# Patient Record
Sex: FEMALE | Race: BLACK OR AFRICAN AMERICAN | Employment: UNEMPLOYED | URBAN - METROPOLITAN AREA
[De-identification: names, ages, dates, MRNs, and addresses within clinical notes are randomized per-mention and may not be internally consistent; named-entity substitution may affect disease eponyms.]

---

## 2017-05-22 ENCOUNTER — HOSPITAL ENCOUNTER (OUTPATIENT)
Dept: LAB | Age: 42
Discharge: HOME | End: 2017-05-22

## 2017-05-22 ENCOUNTER — OFFICE VISIT (OUTPATIENT)
Dept: OBGYN CLINIC | Age: 42
End: 2017-05-22

## 2017-05-22 ENCOUNTER — HOSPITAL ENCOUNTER (OUTPATIENT)
Dept: LAB | Age: 42
Discharge: HOME | End: 2017-05-22
Payer: COMMERCIAL

## 2017-05-22 VITALS
HEART RATE: 81 BPM | SYSTOLIC BLOOD PRESSURE: 119 MMHG | RESPIRATION RATE: 18 BRPM | HEIGHT: 68 IN | WEIGHT: 248 LBS | DIASTOLIC BLOOD PRESSURE: 64 MMHG | BODY MASS INDEX: 37.59 KG/M2

## 2017-05-22 DIAGNOSIS — Z01.419 ENCOUNTER FOR GYNECOLOGICAL EXAMINATION WITHOUT ABNORMAL FINDING: Primary | ICD-10-CM

## 2017-05-22 DIAGNOSIS — N32.81 OAB (OVERACTIVE BLADDER): ICD-10-CM

## 2017-05-22 LAB
BILIRUB UR QL STRIP: NEGATIVE
GLUCOSE UR-MCNC: NEGATIVE MG/DL
KETONES P FAST UR STRIP-MCNC: NEGATIVE MG/DL
PH UR STRIP: 6.5 [PH] (ref 4.6–8)
PROT UR QL STRIP: NEGATIVE MG/DL
SP GR UR STRIP: 1.01 (ref 1–1.03)
UA UROBILINOGEN AMB POC: NORMAL (ref 0.2–1)
URINALYSIS CLARITY POC: CLEAR
URINALYSIS COLOR POC: YELLOW
URINE BLOOD POC: NORMAL
URINE LEUKOCYTES POC: NEGATIVE
URINE NITRITES POC: NEGATIVE

## 2017-05-22 PROCEDURE — 88175 CYTOPATH C/V AUTO FLUID REDO: CPT | Performed by: OBSTETRICS & GYNECOLOGY

## 2017-05-22 PROCEDURE — 87624 HPV HI-RISK TYP POOLED RSLT: CPT | Performed by: OBSTETRICS & GYNECOLOGY

## 2017-05-22 PROCEDURE — 99001 SPECIMEN HANDLING PT-LAB: CPT | Performed by: OBSTETRICS & GYNECOLOGY

## 2017-05-22 RX ORDER — LEVONORGESTREL AND ETHINYL ESTRADIOL 0.15-0.03
1 KIT ORAL DAILY
Qty: 1 PACKAGE | Refills: 3 | Status: SHIPPED | OUTPATIENT
Start: 2017-05-22 | End: 2017-05-22 | Stop reason: SDUPTHER

## 2017-05-22 RX ORDER — LEVONORGESTREL AND ETHINYL ESTRADIOL 0.15-0.03
1 KIT ORAL DAILY
Qty: 1 PACKAGE | Refills: 3 | Status: SHIPPED | OUTPATIENT
Start: 2017-05-22 | End: 2019-04-22 | Stop reason: CLARIF

## 2017-05-22 NOTE — PROGRESS NOTES
Subjective:   46 y.o. female for Well Woman Check. Patient's last menstrual period was 01/01/2017. Social History: single partner, contraception - OCP (Oral Contraceptive Pills). Pertinent past medical hstory: no history of HTN, DVT, CAD, DM, liver disease, migraines or smoking. Current Outpatient Prescriptions   Medication Sig Dispense Refill    levonorgestrel-ethinyl estradiol (SEASONALE CONTRACEPTIVE) 0.15 mg-30 mcg 3MPk Take 1 Tab by mouth daily. 1 Package 3    Dalfampridine (AMPYRA) 10 mg Tb12 Take  by mouth.  mirabegron ER (MYRBETRIQ) 25 mg ER tablet Take 1 Tab by mouth daily. 90 Tab 3    mirabegron ER (MYRBETRIQ) 50 mg ER tablet Take 1 Tab by mouth daily. Indications: URINARY URGE INCONTINENCE 90 Tab 6    tiZANidine (ZANAFLEX) 2 mg tablet       silodosin (RAPAFLO) 8 mg capsule Take 1 Cap by mouth daily (with dinner). 90 Cap 4    DIMETHYL FUMARATE (TECFIDERA PO) 1 twice a day      ergocalciferol (ERGOCALCIFEROL) 50,000 unit capsule once a week      gabapentin (NEURONTIN) 100 mg capsule   1    calcium 500 mg Tab Take  by mouth.  multivitamin (ONE A DAY) tablet Take 1 Tab by mouth daily.          Allergies   Allergen Reactions    Augmentin [Amoxicillin-Pot Clavulanate] Hives and Rash     Past Medical History:   Diagnosis Date    Cervical spondylosis     Hypoglycemia     Incontinence     Neurogenic bladder     OAB (overactive bladder)     Urge incontinence     Vitamin D deficiency      Past Surgical History:   Procedure Laterality Date    HX CHOLECYSTECTOMY      HX GASTRIC BYPASS  2004    LAP,CHOLECYSTECTOMY       Family History   Problem Relation Age of Onset    Hypertension Mother     Diabetes Father     Heart Attack Father     No Known Problems Sister     Diabetes Brother     No Known Problems Sister     Breast Cancer Maternal Grandmother      Social History   Substance Use Topics    Smoking status: Never Smoker    Smokeless tobacco: Never Used    Alcohol use Yes ROS:  Feeling well. No dyspnea or chest pain on exertion. No abdominal pain, change in bowel habits, black or bloody stools. No urinary tract symptoms. GYN ROS: normal menses, no abnormal bleeding, pelvic pain or discharge, no breast pain or new or enlarging lumps on self exam. No neurological complaints. Objective:     Visit Vitals    /64    Pulse 81    Resp 18    Ht 5' 8\" (1.727 m)    Wt 248 lb (112.5 kg)    LMP 01/01/2017    BMI 37.71 kg/m2     The patient appears well, alert, oriented x 3, in no distress. ENT normal.  Neck supple. No adenopathy or thyromegaly. KIRTI. Lungs are clear, good air entry, no wheezes, rhonchi or rales. S1 and S2 normal, no murmurs, regular rate and rhythm. Abdomen soft without tenderness, guarding, mass or organomegaly. Extremities show no edema, normal peripheral pulses. Neurological is normal, no focal findings.     BREAST EXAM: breasts appear normal, no suspicious masses, no skin or nipple changes or axillary nodes    PELVIC EXAM: normal external genitalia, vulva, vagina, cervix, uterus and adnexa, PAP: Pap smear done today, HPV test    Assessment/Plan:   well woman  no contraindication to continue use of oral contraceptives  pap smear  return annually or prn

## 2017-05-23 ENCOUNTER — HOSPITAL ENCOUNTER (OUTPATIENT)
Dept: PHYSICAL THERAPY | Age: 42
Discharge: HOME | End: 2017-05-23
Payer: COMMERCIAL

## 2017-05-23 LAB — B-HCG SERPL QL: NEGATIVE MIU/ML

## 2017-05-23 PROCEDURE — 97110 THERAPEUTIC EXERCISES: CPT

## 2017-05-23 PROCEDURE — 97161 PT EVAL LOW COMPLEX 20 MIN: CPT

## 2017-05-23 NOTE — PROGRESS NOTES
[facility] PHYSICAL THERAPY  [address] [place] [address], [address],[facility], [address] - Phone: [phone]  Fax: 10-23-35-77 OF CARE / [address]  Patient Name: Saulo Medellin : 1975   Medical   Diagnosis: Right foot drop [M21.371] Treatment Diagnosis: Right foot drop [M21.371]   Onset Date: chronic     Referral Source: Dulce, [address] [place] [address]): 2017   Prior Hospitalization: See medical history Provider #: [id number]   Prior Level of Function: Ambulating with cane since . Independent with ADL/IADL's. Performs household chores.  with hand controls. Comorbidities: MS relapsing-remitting. R knee OA   Medications: Verified on Patient Summary List   The Plan of Care and following information is based on the information from the initial evaluation.   ===========================================================================================  Assessment / key information:  Pt is a 46y.o. year old female with subjective complaints of right foot drop with chronic MS. Pt had been dx with MS in , but states she has had foot drop for ~ 10 years. Pt reports clonus and weakness to R>L foot. Pt reports 1 fall this past year due to prolonged standing. As of last MRI in February, lesions had shrunk and no new lesions. Pt has IV infusions every four weeks which help reduce her impairments. Pain is not a problem for which she is being treated. Current functional limitations: ambulating with rollator for ~ 1.5 months, prolonged standing, household chores. FOTO score= 51/100 indicating 49% impairment to functional activities. Today's evaulation is significant for 1) gait impairment: bilateral genu recurvatum, decreased push off R>L, decreased trunk extension, decreased velocity, Right foot drop. 2) strength impairments: Right: DF=3-, Eversion=2-. Inv=3+.  Knee flex/ext=4+, hip flex=3-/5.  3) Impaired AROM/PROM R DF= (-30)/-(20) deg. 4) Increased tone to Right gastroc and hamstrings as per clonus to right PF's and 90=90 hamstring (-50) right (vs. -10 left). 5) Impaired standing balance as per Romberg EO/EC: 30 (increased sway)/5 seconds. Tinetti= 18/28= high fall risk. Pt will be a good candidate for skilled PT to address these impairments and promote return to normal ADLs and functional mobility for improved quality of life.  ===========================================================================================  Eval Complexity: History MEDIUM  Complexity : 1-2 comorbidities / personal factors will impact the outcome/ POC ;  Examination  HIGH Complexity : 4+ Standardized tests and measures addressing body structure, function, activity limitation and / or participation in recreation ; Presentation LOW Complexity : Stable, uncomplicated ;  Decision Making MEDIUM Complexity : FOTO score of 26-74; Overall Complexity LOW   Problem List: decrease ROM, decrease strength, impaired gait/ balance, decrease ADL/ functional abilitiies, decrease activity tolerance, decrease flexibility/ joint mobility and decrease transfer abilities   Treatment Plan may include any combination of the following: Therapeutic exercise, Therapeutic activities, Neuromuscular re-education, Physical agent/modality, Gait/balance training, Manual therapy, Patient education and Functional mobility training  Patient / Family readiness to learn indicated by: asking questions, trying to perform skills and interest  Persons(s) to be included in education: patient (P)  Barriers to Learning/Limitations: None  Measures taken:    Patient Goal (s): \"better balance, flexability and life use of the walkaide\". Patient self reported health status: good  Rehabilitation Potential: good   Short Term Goals: To be accomplished in  2  weeks:  1. Pt will be educated in appropriate HEP for self-management of symptoms.   2.. Pt will maintain standing balance with eyes closed for >/= 10 seconds, LE's in neutral position (no hyperextension) to promote improved neuro-muscular activation and stability for reduced fall risk. 3.  Patient will report at least 50% functional improvement with standing, walking ADL's.  Long Term Goals: To be accomplished in  4-6  weeks:  1. Pt will improve FOTO score to >/= 60 to demo a significant improvement in functional activity tolerance. 2. Pt will achieve >/= +4 GROC in order to promote increased activity tolerance. 3. Pt will increase Tinetti to >/= 23/28 in order to promote reduced risk for fall. 4. Pt will ambulate community distances with least restrictive AD, and good Right foot clearance to reduce risk for fall. Frequency / Duration:   Patient to be seen  2  times per week for 4-6  weeks:  Patient / Caregiver education and instruction: activity modification, brace/ splint application and exercises  G-Codes (GP): n/a  Therapist Signature: Andres Cuadra PT Date: 2/97/3228   Certification Period: n/a Time: 7:51 AM   ===========================================================================================  I certify that the above Physical Therapy Services are being furnished while the patient is under my care. I agree with the treatment plan and certify that this therapy is necessary. Physician Signature:        Date:       Time:     Please sign and return to [facility] at [facility], [facility]. or you may fax the signed copy to [phone]. Thank you.

## 2017-05-23 NOTE — PROGRESS NOTES
PHYSICAL THERAPY - DAILY TREATMENT NOTE    Patient Name: Isra Patient        Date: 2017  : 1975   yes Patient  Verified  Visit #:   1     Insurance: Payor: Fredrich Riedel / Plan: Lloyd Avila PPO / Product Type: PPO /      In time: 11:25 Out time: 12:15   Total Treatment Time: 50     Medicare Time Tracking (below)   Total Timed Codes (min):  n/a 1:1 Treatment Time:  n/a     TREATMENT AREA =  Right foot drop [M21.371]    SUBJECTIVE  Pain Level (on 0 to 10 scale):  0  / 10   Medication Changes/New allergies or changes in medical history, any new surgeries or procedures?    no  If yes, update Summary List   Subjective Functional Status/Changes:  []  No changes reported     See POC          OBJECTIVE    See exam on chart for details on objective findings        8 min Therapeutic Exercise:  [x]  See flow sheet and pt ed below   Rationale:      increase ROM and increase strength to improve the patients ability to perform functional mobility/ADLs and attain goals. min Patient Education:  yes Review HEP, handout created and issued to pt. as per chart. Pt educated in avoidance of genu recurvatum especially with gait and standing activities. Education provided with QS to promote increased proprioception and NMR. []  Progressed/Changed HEP based on: Other Objective/Functional Measures:    See POC  -after session PT calls [facility] and speaks to rep who directs to [employer] in [place] ([phone]); called and spoke to rep who states that pt needs to set up an appointment at their clinic and they do all the teaching/training with the patient at their office. I did call pt and gave her their contact info.      Post Treatment Pain Level (on 0 to 10) scale:   0  / 10     ASSESSMENT  Assessment/Changes in Function:     See POC     []  See Progress Note/Recertification   Patient will continue to benefit from skilled PT services to modify and progress therapeutic interventions, address functional mobility deficits, address ROM deficits, address strength deficits, analyze and address soft tissue restrictions, analyze and cue movement patterns and analyze and modify body mechanics/ergonomics to attain remaining goals. Progress toward goals / Updated goals:    See POC     PLAN  []  Upgrade activities as tolerated yes Continue plan of care   []  Discharge due to :    []  Other:      Therapist: Viktor Nelson. Fabiola Nicole, PT    Date: 5/23/2017 Time: 7:50 AM     Future Appointments  Date Time Provider Adelaida Harper   5/23/2017 11:00 AM Siobhan WHITE  [address] [facility]   6/19/2017 2:00 PM Rush Rodríguez MD [address] [facility]

## 2017-05-25 ENCOUNTER — HOSPITAL ENCOUNTER (OUTPATIENT)
Dept: PHYSICAL THERAPY | Age: 42
Discharge: HOME | End: 2017-05-25
Payer: COMMERCIAL

## 2017-05-25 PROCEDURE — 97110 THERAPEUTIC EXERCISES: CPT

## 2017-05-25 PROCEDURE — 97014 ELECTRIC STIMULATION THERAPY: CPT

## 2017-05-25 NOTE — PROGRESS NOTES
PHYSICAL THERAPY - DAILY TREATMENT NOTE    Patient Name: Isra Patient        Date: 2017  : 1975   YES Patient  Verified  Visit #:   2   of     Insurance: Payor: Fredrich Riedel / Plan: Lloyd Avila PPO / Product Type: PPO /      In time: 11 Out time: 1150   Total Treatment Time: 50     Medicare Time Tracking (below)   Total Timed Codes (min):  50 1:1 Treatment Time:       TREATMENT AREA =  Right foot drop [M21.371]    SUBJECTIVE  Pain Level (on 0 to 10 scale):  0  / 10   Medication Changes/New allergies or changes in medical history, any new surgeries or procedures? NO    If yes, update Summary List   Subjective Functional Status/Changes:  []  No changes reported     Sore after the last session, slept very well that night. (R) foot is the one that gives her trouble while walking. OBJECTIVE  Modalities Rationale:     increase muscle contraction/control to improve patient's ability to perform pain free   12 min [x] Estim, type/location: [place], (R) ant tib. []  att     [x]  unatt     []  w/US     []  w/ice    []  w/heat    min []  Mechanical Traction: type/lbs                   []  pro   []  sup   []  int   []  cont    []  before manual    []  after manual    min []  Ultrasound, settings/location:      min []  Iontophoresis w/ dexamethasone, location:                                               []  take home patch       []  in clinic    min []  Ice     []  Heat    location/position:     min []  Vasopneumatic Device, press/temp:     min []  Other:    [x] Skin assessment post-treatment (if applicable):    [x]  intact    []  redness- no adverse reaction     []redness  adverse reaction:        38 min Therapeutic Exercise:  [x]  See flow sheet   Rationale:      increase ROM, increase strength, improve coordination and improve balance to improve the patients ability to perform pain free ADLs.        min Patient Education:  YES  Reviewed HEP   [] Progressed/Changed HEP based on: Other Objective/Functional Measures: Added multiple exercises to improve LE strength and stability. Post Treatment Pain Level (on 0 to 10) scale:   0   / 10     ASSESSMENT  Assessment/Changes in Function:     Able to complete exercises today. Slow, steady gait. Able to avoid dragging (R) foot. Instructed pt to continue with HEP exercises. []  See Progress Note/Recertification   Patient will continue to benefit from skilled PT services to modify and progress therapeutic interventions, address functional mobility deficits, address ROM deficits, address strength deficits, analyze and address soft tissue restrictions, analyze and cue movement patterns, analyze and modify body mechanics/ergonomics and assess and modify postural abnormalities to attain remaining goals. Progress toward goals / Updated goals:    Initiated therex. PLAN  [x]  Upgrade activities as tolerated YES Continue plan of care   []  Discharge due to :    []  Other:      Therapist: Jorgito Messer PTA    Date: 5/25/2017 Time: 11:48 AM     Future Appointments  Date Time Provider Adelaida Harper   5/30/2017 10:00 AM Siobhan Martinez [facility]   6/1/2017 12:00 PM Jorgito Messer [facility]   6/6/2017 11:00 AM Jorgito Messer [facility]   6/9/2017 2:00 PM Jorgito Messer [facility]   6/13/2017 11:00 AM Jorgito Messer [facility]   6/15/2017 11:00 AM Jorgito Messer [facility]   6/19/2017 2:00 PM Clyde Greenberg MD [address] [facility]   6/20/2017 11:00 AM Jorgito Messer [facility]   6/22/2017 11:00 AM Jorgito Messer [facility]

## 2017-05-30 ENCOUNTER — HOSPITAL ENCOUNTER (OUTPATIENT)
Dept: PHYSICAL THERAPY | Age: 42
Discharge: HOME | End: 2017-05-30
Payer: COMMERCIAL

## 2017-05-30 PROCEDURE — 97014 ELECTRIC STIMULATION THERAPY: CPT

## 2017-05-30 PROCEDURE — 97110 THERAPEUTIC EXERCISES: CPT

## 2017-05-30 NOTE — PROGRESS NOTES
PHYSICAL THERAPY - DAILY TREATMENT NOTE    Patient Name: Shahzad Wiggins        Date: 2017  : 1975   yes Patient  Verified  Visit #:   3     Insurance: Payor: Raquel Stephenson / Plan: Iwona Conway PPO / Product Type: PPO /      In time: 10:11 Out time: 10:55   Total Treatment Time: 44     Medicare Time Tracking (below)   Total Timed Codes (min):  n/a 1:1 Treatment Time:  n/a     TREATMENT AREA =  Right foot drop [M21.371]    SUBJECTIVE  Pain Level (on 0 to 10 scale):  0  / 10   Medication Changes/New allergies or changes in medical history, any new surgeries or procedures?    no  If yes, update Summary List   Subjective Functional Status/Changes:  []  No changes reported     Pt reports standing up a lot yesterday, cooking during cook-out from about 12-7 PM. Pt did have some spasms last night. Pt states she doesn't feel her foot drags as much with her walking. She hasn't contacted [employer] yet to f/u with getting a walk aide. OBJECTIVE  Modalities Rationale:     increase muscle contraction/control to improve patient's ability to promote muscle strength for ambulation.   8 min [x] Estim, type/location: [place], R anterior tib                                     []  att     [x]  unatt     []  w/US     []  w/ice    []  w/heat    min []  Mechanical Traction: type/lbs                   []  pro   []  sup   []  int   []  cont    []  before manual    []  after manual    min []  Ultrasound, settings/location:      min []  Iontophoresis w/ dexamethasone, location:                                               []  take home patch       []  in clinic    min []  Ice     []  Heat    location/position:     min []  Vasopneumatic Device, press/temp:     min []  Other:    [] Skin assessment post-treatment (if applicable):    []  intact    []  redness- no adverse reaction     []redness  adverse reaction:        36 min Therapeutic Exercise:  [x]  See flow sheet   Rationale:      increase ROM and increase strength to improve the patients ability to ambulate and perform ADL's with decreased fall risk     x min Gait Training:  _cues to increase Right DF and avoid R knee hyperextension with wb'ing during ambulation during session   Rationale:        min Patient Education:  yes  Reviewed HEP   []  Progressed/Changed HEP based on: Other Objective/Functional Measures:    -cueing for quad control with wb'ing during standing exercises to avoid hyperextension. Post Treatment Pain Level (on 0 to 10) scale:   0  / 10     ASSESSMENT  Assessment/Changes in Function:     Pt demonstrates improved right knee control with cues for improved quad activation during stance, however needs progressive re-training and strengthening to improve functional carryover to ambulation. []  See Progress Note/Recertification   Patient will continue to benefit from skilled PT services to modify and progress therapeutic interventions, address functional mobility deficits, address ROM deficits, address strength deficits, analyze and address soft tissue restrictions, analyze and cue movement patterns and analyze and modify body mechanics/ergonomics to attain remaining goals. Progress toward goals / Updated goals: · Short Term Goals: To be accomplished in 2 weeks:  1. Pt will be educated in appropriate HEP for self-management of symptoms. -5/30: Pt performs HEP 1 x/day. 2.. Pt will maintain standing balance with eyes closed for >/= 10 seconds, LE's in neutral position (no hyperextension) to promote improved neuro-muscular activation and stability for reduced fall risk. 3. Patient will report at least 50% functional improvement with standing, walking ADL's.     · Long Term Goals: To be accomplished in 4-6 weeks:  1. Pt will improve FOTO score to >/= 60 to demo a significant improvement in functional activity tolerance. 2. Pt will achieve >/= +4 GROC in order to promote increased activity tolerance.   3. Pt will increase Tinetti to >/= 23/28 in order to promote reduced risk for fall. 4. Pt will ambulate community distances with least restrictive AD, and good Right foot clearance to reduce risk for fall. PLAN  []  Upgrade activities as tolerated yes Continue plan of care   []  Discharge due to :    []  Other:      Therapist: Renata Garza. Danna Peralta, PT    Date: 5/30/2017 Time: 9:18 AM     Future Appointments  Date Time Provider Adelaida Harper   5/30/2017 10:00 AM Siobhan Peralta, [facility]   6/1/2017 12:00 PM Shabana Earl [facility]   6/6/2017 11:00 AM Shabana Earl [facility]   6/9/2017 2:00 PM Shabana Earl [facility]   6/13/2017 11:00 AM Shabana Earl [facility]   6/15/2017 11:00 AM Shabana Earl [facility]   6/19/2017 2:00 PM Archana Gann MD [address] [facility]   6/20/2017 11:00 AM Shabana Earl [facility]   6/22/2017 11:00 AM Shabana Earl, [facility]

## 2017-06-01 ENCOUNTER — HOSPITAL ENCOUNTER (OUTPATIENT)
Dept: PHYSICAL THERAPY | Age: 42
Discharge: HOME | End: 2017-06-01
Payer: COMMERCIAL

## 2017-06-01 PROCEDURE — 97140 MANUAL THERAPY 1/> REGIONS: CPT

## 2017-06-01 PROCEDURE — 97110 THERAPEUTIC EXERCISES: CPT

## 2017-06-01 PROCEDURE — 97014 ELECTRIC STIMULATION THERAPY: CPT

## 2017-06-01 NOTE — PROGRESS NOTES
PHYSICAL THERAPY - DAILY TREATMENT NOTE    Patient Name: Racheal Mace        Date: 2017  : 1975   YES Patient  Verified  Visit #:   4     Insurance: Payor: Jayjay Maldonado / Plan: Krista Keller PPO / Product Type: PPO /      In time: 3095 Out time: 1510   Total Treatment Time: 37     Medicare Time Tracking (below)   Total Timed Codes (min):  37 1:1 Treatment Time:       TREATMENT AREA =  Right foot drop [M21.371]    SUBJECTIVE  Pain Level (on 0 to 10 scale):  0   / 10   Medication Changes/New allergies or changes in medical history, any new surgeries or procedures? NO    If yes, update Summary List   Subjective Functional Status/Changes:  []  No changes reported     Says she can move her ankle but the motion decreases pretty quick, ankle gets tired. OBJECTIVE  Modalities Rationale:     increase muscle contraction/control to improve patient's ability to perform pain free ADLs. 10 min [x] Estim, type/location: [facility], 4:12 on:off. (R) tib anterior. []  att     [x]  unatt     []  w/US     []  w/ice    []  w/heat    min []  Mechanical Traction: type/lbs                   []  pro   []  sup   []  int   []  cont    []  before manual    []  after manual    min []  Ultrasound, settings/location:      min []  Iontophoresis w/ dexamethasone, location:                                               []  take home patch       []  in clinic    min []  Ice     []  Heat    location/position:     min []  Vasopneumatic Device, press/temp:     min []  Other:    [x] Skin assessment post-treatment (if applicable):    [x]  intact    []  redness- no adverse reaction     []redness  adverse reaction:        27 min Therapeutic Exercise:  [x]  See flow sheet   Rationale:      increase ROM, increase strength, improve coordination, improve balance and increase proprioception to improve the patients ability to ambulate.        min Patient Education:  YES  Reviewed HEP   [] Progressed/Changed HEP based on: Other Objective/Functional Measures: Therex per flow sheet. Post Treatment Pain Level (on 0 to 10) scale:   0   / 10     ASSESSMENT  Assessment/Changes in Function:     No change in progress toward LTG's with today's session. []  See Progress Note/Recertification   Patient will continue to benefit from skilled PT services to modify and progress therapeutic interventions, address functional mobility deficits, address ROM deficits, address strength deficits, analyze and address soft tissue restrictions, analyze and cue movement patterns, analyze and modify body mechanics/ergonomics and assess and modify postural abnormalities to attain remaining goals. Progress toward goals / Updated goals:    No change in progress toward LTG's with today's session.       PLAN  [x]  Upgrade activities as tolerated YES Continue plan of care   []  Discharge due to :    []  Other:      Therapist: Maral Arteaga PTA    Date: 6/1/2017 Time: 12:39 PM     Future Appointments  Date Time Provider Adelaida Harper   6/6/2017 11:00 AM Maral Arteaga PTA [facility]   6/9/2017 2:00 PM Maral Arteaga PTA [facility]   6/13/2017 11:00 AM Maral Arteaga PTA [facility]   6/15/2017 11:00 AM Maral Arteaga PTA [facility]   6/19/2017 2:00 PM Brody Grace MD [address] [facility]   6/20/2017 11:00 AM Maral Arteaga PTA [facility]   6/22/2017 11:00 AM Maral Arteaga PTA [facility]

## 2017-06-06 ENCOUNTER — HOSPITAL ENCOUNTER (OUTPATIENT)
Dept: PHYSICAL THERAPY | Age: 42
Discharge: HOME | End: 2017-06-06
Payer: COMMERCIAL

## 2017-06-06 PROCEDURE — 97014 ELECTRIC STIMULATION THERAPY: CPT

## 2017-06-06 PROCEDURE — 97110 THERAPEUTIC EXERCISES: CPT

## 2017-06-06 NOTE — PROGRESS NOTES
PHYSICAL THERAPY - DAILY TREATMENT NOTE    Patient Name: Hayder Davison        Date: 2017  : 1975   YES Patient  Verified  Visit #:     Insurance: Payor: Elie Gill / Plan: Santiago Thao PPO / Product Type: PPO /      In time: 1110 Out time: 12   Total Treatment Time: 50     Medicare Time Tracking (below)   Total Timed Codes (min):  50 1:1 Treatment Time:       TREATMENT AREA =  Right foot drop [M21.371]    SUBJECTIVE  Pain Level (on 0 to 10 scale):  4  / 10   Medication Changes/New allergies or changes in medical history, any new surgeries or procedures? NO    If yes, update Summary List   Subjective Functional Status/Changes:  []  No changes reported     Pt reporting some fatigue - hasn't had an infusion in awhile. Getting an infusion this Thursday. OBJECTIVE  Modalities Rationale:     increase muscle contraction/control to improve patient's ability to ambulate. 12 min [x] Estim, type/location: 10:20, [place], (R) ant tib. []  att     [x]  unatt     []  w/US     []  w/ice    []  w/heat    min []  Mechanical Traction: type/lbs                   []  pro   []  sup   []  int   []  cont    []  before manual    []  after manual    min []  Ultrasound, settings/location:      min []  Iontophoresis w/ dexamethasone, location:                                               []  take home patch       []  in clinic    min []  Ice     []  Heat    location/position:     min []  Vasopneumatic Device, press/temp:     min []  Other:    [x] Skin assessment post-treatment (if applicable):    [x]  intact    []  redness- no adverse reaction     []redness  adverse reaction:        38 min Therapeutic Exercise:  [x]  See flow sheet   Rationale:      increase ROM, increase strength, improve coordination, improve balance and increase proprioception to improve the patients ability to ambulate.        min Patient Education:  YES  Reviewed HEP   []  Progressed/Changed HEP based on: Other Objective/Functional Measures: Therex per flow sheet. Ant tib strength = 4/5      Post Treatment Pain Level (on 0 to 10) scale:   0   / 10     ASSESSMENT  Assessment/Changes in Function:     No exacerbation of symptoms with today's session. []  See Progress Note/Recertification   Patient will continue to benefit from skilled PT services to modify and progress therapeutic interventions, address functional mobility deficits, address ROM deficits, address strength deficits, analyze and address soft tissue restrictions, analyze and cue movement patterns, analyze and modify body mechanics/ergonomics and assess and modify postural abnormalities to attain remaining goals. Progress toward goals / Updated goals: Increase in reported pain secondary to no recent infusion.        PLAN  [x]  Upgrade activities as tolerated YES Continue plan of care   []  Discharge due to :    []  Other:      Therapist: Madelyne Peabody, PTA    Date: 6/6/2017 Time: 11:31 AM     Future Appointments  Date Time Provider Adelaida Harper   6/9/2017 2:00 PM Madelyne Peabody, PTA [facility]   6/13/2017 11:00 AM Madelyne Peabody, PTA [facility]   6/15/2017 11:00 AM Madelyne Peabody, PTA [facility]   6/19/2017 2:00 PM Rabia Valles MD [address] [facility]   6/20/2017 11:00 AM Madelyne Peabody, PTA [facility]   6/22/2017 11:00 AM Madelyne Peabody, PTA [facility]

## 2017-06-08 ENCOUNTER — APPOINTMENT (OUTPATIENT)
Dept: PHYSICAL THERAPY | Age: 42
End: 2017-06-08
Payer: COMMERCIAL

## 2017-06-09 ENCOUNTER — HOSPITAL ENCOUNTER (OUTPATIENT)
Dept: PHYSICAL THERAPY | Age: 42
Discharge: HOME | End: 2017-06-09
Payer: COMMERCIAL

## 2017-06-09 PROCEDURE — 97014 ELECTRIC STIMULATION THERAPY: CPT

## 2017-06-09 PROCEDURE — 97110 THERAPEUTIC EXERCISES: CPT

## 2017-06-09 NOTE — PROGRESS NOTES
PHYSICAL THERAPY - DAILY TREATMENT NOTE    Patient Name: Isra Patient        Date: 2017  : 1975   YES Patient  Verified  Visit #:     Insurance: Payor: Fredrich Riedel / Plan: Lloyd Avila PPO / Product Type: PPO /      In time: 215 Out time: 250   Total Treatment Time: 35     Medicare Time Tracking (below)   Total Timed Codes (min):  35 1:1 Treatment Time:       TREATMENT AREA =  Right foot drop [M21.371]    SUBJECTIVE  Pain Level (on 0 to 10 scale):  0   / 10   Medication Changes/New allergies or changes in medical history, any new surgeries or procedures? NO    If yes, update Summary List   Subjective Functional Status/Changes:  []  No changes reported     Pt arrived 13' late for scheduled appointment. Informed pt we would not complete all therex today, pt verbalized understanding/ok with this. OBJECTIVE  Modalities Rationale:     increase muscle contraction/control to improve patient's ability to ambulate. 15 min [x] Estim, type/location: [place], 10:20, (R) ant tib. []  att     [x]  unatt     []  w/US     []  w/ice    []  w/heat    min []  Mechanical Traction: type/lbs                   []  pro   []  sup   []  int   []  cont    []  before manual    []  after manual    min []  Ultrasound, settings/location:      min []  Iontophoresis w/ dexamethasone, location:                                               []  take home patch       []  in clinic    min []  Ice     []  Heat    location/position:     min []  Vasopneumatic Device, press/temp:     min []  Other:    [x] Skin assessment post-treatment (if applicable):    [x]  intact    []  redness- no adverse reaction     []redness  adverse reaction:        20 min Therapeutic Exercise:  [x]  See flow sheet   Rationale:      increase strength, improve coordination, improve balance and increase proprioception to improve the patients ability to ambulate.        min Patient Education:  YES  Reviewed HEP []  Progressed/Changed HEP based on: Other Objective/Functional Measures: Therex per flow sheet. Held mat exercises, pt instructed to continue with HEP exercises. Added sidestepping @ parallel bars to improve gait ability, lateral hip strength. Post Treatment Pain Level (on 0 to 10) scale:   0   / 10     ASSESSMENT  Assessment/Changes in Function:     No exacerbation of symptoms with today's session. []  See Progress Note/Recertification   Patient will continue to benefit from skilled PT services to modify and progress therapeutic interventions, address functional mobility deficits, address ROM deficits, address strength deficits, analyze and address soft tissue restrictions, analyze and cue movement patterns, analyze and modify body mechanics/ergonomics and assess and modify postural abnormalities to attain remaining goals. Progress toward goals / Updated goals:    No change in progress toward LTG's with today's session.        PLAN  [x]  Upgrade activities as tolerated YES Continue plan of care   []  Discharge due to :    []  Other:      Therapist: Agusto Anderson PTA    Date: 6/9/2017 Time: 4:15 PM     Future Appointments  Date Time Provider Adelaida Harper   6/13/2017 11:00 AM Agusto Anderson PTA [facility]   6/15/2017 11:00 AM Agusto Anderson PTA [facility]   6/19/2017 2:00 PM Maicol Madden MD [address] [place] [address] Drive   6/20/2017 11:00 AM Agusto Anderson PTA [facility]   6/22/2017 11:00 AM Agusto Anderson PTA [facility]

## 2017-06-13 ENCOUNTER — APPOINTMENT (OUTPATIENT)
Dept: PHYSICAL THERAPY | Age: 42
End: 2017-06-13
Payer: COMMERCIAL

## 2017-06-14 ENCOUNTER — APPOINTMENT (OUTPATIENT)
Dept: PHYSICAL THERAPY | Age: 42
End: 2017-06-14
Payer: COMMERCIAL

## 2017-06-15 ENCOUNTER — HOSPITAL ENCOUNTER (OUTPATIENT)
Dept: PHYSICAL THERAPY | Age: 42
Discharge: HOME | End: 2017-06-15
Payer: COMMERCIAL

## 2017-06-15 PROCEDURE — 97110 THERAPEUTIC EXERCISES: CPT

## 2017-06-15 PROCEDURE — 97014 ELECTRIC STIMULATION THERAPY: CPT

## 2017-06-20 ENCOUNTER — HOSPITAL ENCOUNTER (OUTPATIENT)
Dept: PHYSICAL THERAPY | Age: 42
Discharge: HOME | End: 2017-06-20
Payer: COMMERCIAL

## 2017-06-20 PROCEDURE — 97014 ELECTRIC STIMULATION THERAPY: CPT

## 2017-06-20 PROCEDURE — 97110 THERAPEUTIC EXERCISES: CPT

## 2017-06-20 NOTE — PROGRESS NOTES
PHYSICAL THERAPY - DAILY TREATMENT NOTE    Patient Name: Renetta Tapia        Date: 2017  : 1975   YES Patient  Verified  Visit #:     Insurance: Payor: Jarred Navarro / Plan: Blessing Tijerina PPO / Product Type: PPO /      In time: 1110 Out time: 12   Total Treatment Time: 50     Medicare Time Tracking (below)   Total Timed Codes (min):  50 1:1 Treatment Time:       TREATMENT AREA =  Right foot drop [M21.371]    SUBJECTIVE  Pain Level (on 0 to 10 scale):  0   / 10   Medication Changes/New allergies or changes in medical history, any new surgeries or procedures? NO    If yes, update Summary List   Subjective Functional Status/Changes:  []  No changes reported     \"Feel like I'm walking much better, smoother. Picking the toes up much better as well. \"          OBJECTIVE  Modalities Rationale:     increase muscle contraction/control to improve patient's ability to perform pain free ADLs. 12 min [x] Estim, type/location: [place], 10:20, (R) Ant tib                                     []  att     [x]  unatt     []  w/US     []  w/ice    []  w/heat    min []  Mechanical Traction: type/lbs                   []  pro   []  sup   []  int   []  cont    []  before manual    []  after manual    min []  Ultrasound, settings/location:      min []  Iontophoresis w/ dexamethasone, location:                                               []  take home patch       []  in clinic    min []  Ice     []  Heat    location/position:     min []  Vasopneumatic Device, press/temp:     min []  Other:    [x] Skin assessment post-treatment (if applicable):    [x]  intact    []  redness- no adverse reaction     []redness  adverse reaction:        38 min Therapeutic Exercise:  [x]  See flow sheet   Rationale:      increase ROM, increase strength, improve coordination and improve balance to improve the patients ability to perform pain free ADLs.        min Patient Education:  YES  Reviewed HEP   []  Progressed/Changed HEP based on:        Other Objective/Functional Measures: Added step ups/tap ups to improve LE strength and stability. Provided additional HEP exercises. Post Treatment Pain Level (on 0 to 10) scale:   0   / 10     ASSESSMENT  Assessment/Changes in Function:     Pt fatigues quickly during standing therex, however able to complete. Pt able to self correct LOB. Reviewed HEP exercises. []  See Progress Note/Recertification   Patient will continue to benefit from skilled PT services to modify and progress therapeutic interventions, address functional mobility deficits, address ROM deficits, address strength deficits, analyze and address soft tissue restrictions, analyze and cue movement patterns, analyze and modify body mechanics/ergonomics and assess and modify postural abnormalities to attain remaining goals. Progress toward goals / Updated goals:    Pt demonstrating improvement in gait ability. Progressing toward LTG #1.      PLAN  [x]  Upgrade activities as tolerated YES Continue plan of care   []  Discharge due to :    []  Other:      Therapist: Maral Arteaga PTA    Date: 6/20/2017 Time: 11:17 AM     Future Appointments  Date Time Provider Adelaida Harper   6/22/2017 11:00 AM Maral Arteaga PTA [facility]   10/19/2017 11:15 AM Brody Grace MD [address] [facility]

## 2017-06-22 ENCOUNTER — HOSPITAL ENCOUNTER (OUTPATIENT)
Dept: PHYSICAL THERAPY | Age: 42
Discharge: HOME | End: 2017-06-22
Payer: COMMERCIAL

## 2017-06-22 PROCEDURE — 97014 ELECTRIC STIMULATION THERAPY: CPT

## 2017-06-22 PROCEDURE — 97110 THERAPEUTIC EXERCISES: CPT

## 2017-06-22 NOTE — PROGRESS NOTES
PHYSICAL THERAPY - DAILY TREATMENT NOTE    Patient Name: Marci Rahman        Date: 2017  : 1975   YES Patient  Verified  Visit #:     Insurance: Payor: Nida Zhang / Plan: Bety Onofre PPO / Product Type: PPO /      In time: 1110 Out time: 1155   Total Treatment Time: 45     Medicare Time Tracking (below)   Total Timed Codes (min):  45 1:1 Treatment Time:       TREATMENT AREA =  Right foot drop [M21.371]    SUBJECTIVE  Pain Level (on 0 to 10 scale):  2  / 10   Medication Changes/New allergies or changes in medical history, any new surgeries or procedures? NO    If yes, update Summary List   Subjective Functional Status/Changes:  []  No changes reported     Pain has never been an issue. No recent falls. Uses a mix of cane/Rollator, 50/50. Pt reports ~75% improvement since beginning PT.         OBJECTIVE  Modalities Rationale:     increase muscle contraction/control  to improve patient's ability to perform pain free ADLs. 12 min [x] [facility] (R) ant tib. []  att     [x]  unatt     []  w/US     []  w/ice    []  w/heat    min []  Mechanical Traction: type/lbs                   []  pro   []  sup   []  int   []  cont    []  before manual    []  after manual    min []  Ultrasound, settings/location:      min []  Iontophoresis w/ dexamethasone, location:                                               []  take home patch       []  in clinic    min []  Ice     []  Heat    location/position:     min []  Vasopneumatic Device, press/temp:     min []  Other:    [x] Skin assessment post-treatment (if applicable):    [x]  intact    []  redness- no adverse reaction     []redness  adverse reaction:        33 min Therapeutic Exercise:  [x]  See flow sheet   Rationale:      increase ROM, increase strength, improve coordination, improve balance and increase proprioception to improve the patients ability to ambulate.        min Patient Education:  YES  Reviewed HEP   []  Progressed/Changed HEP based on: Other Objective/Functional Measures:    Tinetti score = 21/28    Gait w/ Rollator, marked sway to (L) during (R) swing phase. Inconsistent (R) foot clearing during swing phase. (R) ankle AROM Ev 18 deg, Inv 15 deg, DF -36 deg, (DF = -12 deg PROM)     Post Treatment Pain Level (on 0 to 10) scale:   0   / 10     ASSESSMENT  Assessment/Changes in Function:     See note     []  See Progress Note/Recertification   Patient will continue to benefit from skilled PT services to modify and progress therapeutic interventions, address functional mobility deficits, address ROM deficits, address strength deficits, analyze and address soft tissue restrictions, analyze and cue movement patterns, analyze and modify body mechanics/ergonomics and assess and modify postural abnormalities to attain remaining goals.    Progress toward goals / Updated goals:    See note     PLAN  [x]  Upgrade activities as tolerated YES Continue plan of care   []  Discharge due to :    []  Other:      Therapist: Shabana Ealr PTA    Date: 6/22/2017 Time: 11:29 AM     Future Appointments  Date Time Provider Adelaida Harper   10/19/2017 11:15 AM Archana Gann MD [facility]

## 2017-06-22 NOTE — PROGRESS NOTES
[facility] PHYSICAL THERAPY  [address] [place] [address] 201,[facility], [address] [facility] - Phone: [phone]  Fax: [phone]  PROGRESS NOTE  Patient Name: Renetta Tapia : 1975   Treatment/Medical Diagnosis: Right foot drop [M21.371]   Referral Source: Kg Ma*     Date of Initial Visit: 17 Attended Visits: 9 Missed Visits: 2     SUMMARY OF TREATMENT  PT has consisted of initial evaluation, therapeutic exercises, HEP, manual therapy, patient education, and modalities. CURRENT STATUS  [facility]/o (R) foot drop w/ chronic MS. Pt currently reports 75% improvement since beginning PT. Pt states she alternates use of SPC and rollator, using each ~50% of her walking time. (R) DF remains limited, -36 deg AROM, -12 deg PROM. (B) genu recurvatum, (L) sway during (R) swing phase, and inconsistent clearance of the (R) foot are all present during gait. Pt is able to demonstrate improvement in contraction of the (R) ant tib. Goal/Measure of Progress Goal Met? 1. Pt will improve FOTO score to >/= 60 to demo a significant improvement in functional activity tolerance. Status at last Eval: 51 Current Status: 38 no   2. Pt will achieve >/= +4 GROC in order to promote increased activity tolerance. Status at last Eval: Na  Current Status: +4  yes   3. Pt will increase Tinetti to >/=  in order to promote reduced risk for fall. Status at last Eval:  Current Status:  progressing     1. Pt will ambulate community distances with least restrictive AD, and good Right foot clearance to reduce risk for fall. Status at last Eval: Unable  Current Status: Ambulating 100' w/ Rollator, fair (R) foot clearance progressing     New Goals to be achieved in __4__  weeks:  1. Pt will improve FOTO score to >/= 60 to demo a significant improvement in functional activity tolerance.   2. Pt will achieve >/= +6 GROC in order to promote increased activity tolerance. 3. Pt will increase Tinetti to >/= 23/28 in order to promote reduced risk for fall. 4. Pt will ambulate community distances with least restrictive AD, and good Right foot clearance to reduce risk for fall. RECOMMENDATIONS  Pt to continue 2x weekly for additional 4 weeks to improve LE strength, ROM, and endurance to further improve gait ability. If you have any questions/comments please contact us directly at [phone]. Thank you for allowing us to assist in the care of your patient. LPTA Signature: Leo Fuentes PTA  Date: 6/23/2017   PT Signature: AYDE Barrientos Time: 4:50 PM   NOTE TO PHYSICIAN:  PLEASE COMPLETE THE ORDERS BELOW AND FAX TO   [facility] Physical Therapy: [phone]  If you are unable to process this request in 24 hours please contact our office: [phone]    ___ I have read the above report and request that my patient continue as recommended.   ___ I have read the above report and request that my patient continue therapy with the following changes/special instructions:_________________________________________________________   ___ I have read the above report and request that my patient be discharged from therapy.      Physician Signature:        Date:       Time:

## 2017-07-06 ENCOUNTER — HOSPITAL ENCOUNTER (OUTPATIENT)
Dept: PHYSICAL THERAPY | Age: 42
Discharge: HOME | End: 2017-07-06
Payer: COMMERCIAL

## 2017-07-06 PROCEDURE — 97110 THERAPEUTIC EXERCISES: CPT

## 2017-07-06 PROCEDURE — 97014 ELECTRIC STIMULATION THERAPY: CPT

## 2017-07-06 NOTE — PROGRESS NOTES
PHYSICAL THERAPY - DAILY TREATMENT NOTE    Patient Name: Diana Cooney        Date: 2017  : 1975   YES Patient  Verified  Visit #:   10   of   18  Insurance: Payor: Shabbir Blair / Plan: Oly King PPO / Product Type: PPO /      In time: 925 Out time: 1010   Total Treatment Time: 45     Medicare Time Tracking (below)   Total Timed Codes (min):  45 1:1 Treatment Time:       TREATMENT AREA =  Right foot drop [M21.371]    SUBJECTIVE  Pain Level (on 0 to 10 scale):  0  / 10   Medication Changes/New allergies or changes in medical history, any new surgeries or procedures? NO    If yes, update Summary List   Subjective Functional Status/Changes:  []  No changes reported     No c/o pain. No reports of falls/LOB. OBJECTIVE  Modalities Rationale:     increase muscle contraction/control to improve patient's ability to ambulate. 10 min [x] Estim, type/location: [facility], 10:20, (R) ant tib. []  att     [x]  unatt     []  w/US     []  w/ice    []  w/heat    min []  Mechanical Traction: type/lbs                   []  pro   []  sup   []  int   []  cont    []  before manual    []  after manual    min []  Ultrasound, settings/location:      min []  Iontophoresis w/ dexamethasone, location:                                               []  take home patch       []  in clinic    min []  Ice     []  Heat    location/position:     min []  Vasopneumatic Device, press/temp:     min []  Other:    [x] Skin assessment post-treatment (if applicable):    [x]  intact    []  redness- no adverse reaction     []redness  adverse reaction:        35 min Therapeutic Exercise:  [x]  See flow sheet   Rationale:      increase ROM, increase strength, improve coordination and improve balance to improve the patients ability to perform pain free ADLs. min Patient Education:  YES  Reviewed HEP   []  Progressed/Changed HEP based on: Other Objective/Functional Measures:     Therex per flow sheet. Post Treatment Pain Level (on 0 to 10) scale:   0  / 10     ASSESSMENT  Assessment/Changes in Function:     Slight improvement in balance ability, see flow sheet. []  See Progress Note/Recertification   Patient will continue to benefit from skilled PT services to modify and progress therapeutic interventions, address functional mobility deficits, address ROM deficits, address strength deficits, analyze and address soft tissue restrictions, analyze and cue movement patterns, analyze and modify body mechanics/ergonomics, assess and modify postural abnormalities and address imbalance/dizziness to attain remaining goals. Progress toward goals / Updated goals:    No change in progress toward LTG's with today's session. PLAN  [x]  Upgrade activities as tolerated YES Continue plan of care   []  Discharge due to :    []  Other:      Therapist: Jose Najera PTA    Date: 7/6/2017 Time: 10:32 AM     Future Appointments  Date Time Provider Adelaida Harper   7/11/2017 8:30 AM Harmeet Castañeda PTA [facility]   7/13/2017 8:30 AM Harmeet Castañeda PTA [facility]   7/18/2017 8:30 AM Jose Najera PTA [facility]   7/20/2017 8:30 AM Jose Najera PTA [facility]   7/25/2017 8:30 AM Jose Najera PTA [facility]   7/27/2017 8:30 AM Jose Najera [facility]   8/1/2017 8:30 AM Ulysses Laundry D. Lonn Doheny [facility]   8/3/2017 8:30 AM Jose Najera [facility]   10/19/2017 11:15 AM Woody Byrd MD [place] [address]

## 2017-07-11 ENCOUNTER — HOSPITAL ENCOUNTER (OUTPATIENT)
Dept: PHYSICAL THERAPY | Age: 42
Discharge: HOME | End: 2017-07-11
Payer: COMMERCIAL

## 2017-07-11 PROCEDURE — 97014 ELECTRIC STIMULATION THERAPY: CPT

## 2017-07-11 PROCEDURE — 97110 THERAPEUTIC EXERCISES: CPT

## 2017-07-11 NOTE — PROGRESS NOTES
PHYSICAL THERAPY - DAILY TREATMENT NOTE    Patient Name: Alexsandra Garcia        Date: 2017  : 1975   YES Patient  Verified  Visit #:     Insurance: Payor: Wilfredo Lopez / Plan: Merrill Diaz PPO / Product Type: PPO /      In time: 835 Out time: 331   Total Treatment Time: 60     Medicare Time Tracking (below)   Total Timed Codes (min):   1:1 Treatment Time:       TREATMENT AREA =  Right foot drop [M21.371]    SUBJECTIVE  Pain Level (on 0 to 10 scale): 3-4  / 10   Medication Changes/New allergies or changes in medical history, any new surgeries or procedures? NO    If yes, update Summary List   Subjective Functional Status/Changes:  []  No changes reported     I fell on Monday trying to dameon her kids and getting her walker into the car. OBJECTIVE  Modalities Rationale:     decrease pain, increase tissue extensibility and increase muscle contraction/control to improve patient's ability to ambulate. 10 min [x] Estim, type/location: [facility], 10:20, (R) ant tib. []  att     [x]  unatt     []  w/US     []  w/ice    []  w/heat    min []  Mechanical Traction: type/lbs                   []  pro   []  sup   []  int   []  cont    []  before manual    []  after manual    min []  Ultrasound, settings/location:      min []  Iontophoresis w/ dexamethasone, location:                                               []  take home patch       []  in clinic    min []  Ice     []  Heat    location/position:     min []  Vasopneumatic Device, press/temp:     min []  Other:    [x] Skin assessment post-treatment (if applicable):    [x]  intact    []  redness- no adverse reaction     []redness  adverse reaction:        50 min Therapeutic Exercise:  [x]  See flow sheet   Rationale:      increase ROM and increase strength to improve the patients ability to ambulate. min Patient Education:  YES  Reviewed HEP   []  Progressed/Changed HEP based on:         Other Objective/Functional Measures:    No change in functional measurements noted today. Post Treatment Pain Level (on 0 to 10) scale:   0  / 10     ASSESSMENT  Assessment/Changes in Function:     Patient challenged to use (L) LE for sit <> stand. @ inch platform placed under (R) foot to increase work load on (L). Vcs to perform glute sets with standing and stting. []  See Progress Note/Recertification   Patient will continue to benefit from skilled PT services to modify and progress therapeutic interventions, address functional mobility deficits, address ROM deficits, address strength deficits, analyze and address soft tissue restrictions and analyze and cue movement patterns to attain remaining goals. Progress toward goals / Updated goals:    No change toward goals today. PLAN  []  Upgrade activities as tolerated YES Continue plan of care   []  Discharge due to :    []  Other:      Therapist: HUBER Roldan    Date: 7/11/2017 Time: 6:23 AM       Future Appointments  Date Time Provider Adelaida Harper   7/11/2017 8:30 AM Migeul Henderson, [facility]   7/13/2017 8:30 AM Miguel Henderson, [facility]   7/18/2017 8:30 AM Juan Higuera, [facility]   7/20/2017 8:30 AM Juan Higuera, [facility]   7/25/2017 8:30 AM Juan Lawlerner, [facility]   7/27/2017 8:30 AM Juan Higuera, [facility]   8/1/2017 8:30 AM Franki Estrada [facility]   8/3/2017 8:30 AM Juan Higuera, [facility]   10/19/2017 11:15 AM Jose Salmeron MD [address] [place] [address]

## 2017-07-13 ENCOUNTER — APPOINTMENT (OUTPATIENT)
Dept: PHYSICAL THERAPY | Age: 42
End: 2017-07-13
Payer: COMMERCIAL

## 2017-07-18 ENCOUNTER — HOSPITAL ENCOUNTER (OUTPATIENT)
Dept: PHYSICAL THERAPY | Age: 42
Discharge: HOME | End: 2017-07-18
Payer: COMMERCIAL

## 2017-07-18 PROCEDURE — 97014 ELECTRIC STIMULATION THERAPY: CPT

## 2017-07-18 PROCEDURE — 97530 THERAPEUTIC ACTIVITIES: CPT

## 2017-07-18 PROCEDURE — 97110 THERAPEUTIC EXERCISES: CPT

## 2017-07-18 PROCEDURE — 97140 MANUAL THERAPY 1/> REGIONS: CPT

## 2017-07-18 NOTE — PROGRESS NOTES
PHYSICAL THERAPY - DAILY TREATMENT NOTE    Patient Name: Cosmo Landry        Date: 2017  : 1975   YES Patient  Verified  Visit #:     Insurance: Payor: Cierra Pain / Plan: Michelle Benavides PPO / Product Type: PPO /      In time: 835 Out time: 930   Total Treatment Time: 55     Medicare Time Tracking (below)   Total Timed Codes (min):  55 1:1 Treatment Time:       TREATMENT AREA =  Right foot drop [M21.371]    SUBJECTIVE  Pain Level (on 0 to 10 scale):  0   / 10   Medication Changes/New allergies or changes in medical history, any new surgeries or procedures? NO    If yes, update Summary List   Subjective Functional Status/Changes:  []  No changes reported   \"I had my infusion recently, feel much better, no pain. I always have a bit more energy           OBJECTIVE  Modalities Rationale:     increase muscle contraction/control to improve patient's ability to ambulate. 10 min [x] Estim, type/location: [facility], 10:20, (R) ant tib. []  att     [x]  unatt     []  w/US     []  w/ice    []  w/heat    min []  Mechanical Traction: type/lbs                   []  pro   []  sup   []  int   []  cont    []  before manual    []  after manual    min []  Ultrasound, settings/location:      min []  Iontophoresis w/ dexamethasone, location:                                               []  take home patch       []  in clinic    min []  Ice     []  Heat    location/position:     min []  Vasopneumatic Device, press/temp:     min []  Other:    [x] Skin assessment post-treatment (if applicable):    [x]  intact    []  redness- no adverse reaction     []redness  adverse reaction:        30 min Therapeutic Exercise:  [x]  See flow sheet   Rationale:      increase ROM, increase strength, improve coordination, improve balance and increase proprioception to improve the patients ability to ambulate. 15 Min Therapeutic Activity: Balance and ambulation activities per flow sheet. Rationale:    increase strength, improve coordination, improve balance and increase proprioception to improve the patients ability to ambulate. min Patient Education:  YES  Reviewed HEP   []  Progressed/Changed HEP based on: Other Objective/Functional Measures: Therex per flow sheet. Ambulation w/o dragging (R) foot ~75% of treatment time. Post Treatment Pain Level (on 0 to 10) scale:   0  / 10     ASSESSMENT  Assessment/Changes in Function:     Minimal VC's required for therex. Pt demonstrates (R) foot drag only when fatigued. Able to maintain good DF during bridges. []  See Progress Note/Recertification   Patient will continue to benefit from skilled PT services to modify and progress therapeutic interventions, address functional mobility deficits, address ROM deficits, address strength deficits, analyze and address soft tissue restrictions, analyze and cue movement patterns, analyze and modify body mechanics/ergonomics and assess and modify postural abnormalities to attain remaining goals. Progress toward goals / Updated goals:    Gradual improvement in (R) DF. Progressing toward LTG #4. PLAN  [x]  Upgrade activities as tolerated YES Continue plan of care   []  Discharge due to :    []  Other:      Therapist: Gamal Israel PTA    Date: 7/18/2017 Time: 8:49 AM     Future Appointments  Date Time Provider Adelaida Harper   7/20/2017 8:30 AM Gamal Israel PTA [facility]   7/25/2017 8:30 AM Gamal Israel PTA [facility]   7/27/2017 8:30 AM Gamal Israel PTA [facility]   8/1/2017 8:30 AM Cristhian Champion [facility]   8/3/2017 8:30 AM Gamal Israel PTA [facility]   10/19/2017 11:15 AM Indira Villaseñor MD [address]

## 2017-07-20 ENCOUNTER — HOSPITAL ENCOUNTER (OUTPATIENT)
Dept: PHYSICAL THERAPY | Age: 42
Discharge: HOME | End: 2017-07-20
Payer: COMMERCIAL

## 2017-07-20 PROCEDURE — 97110 THERAPEUTIC EXERCISES: CPT

## 2017-07-20 PROCEDURE — 97014 ELECTRIC STIMULATION THERAPY: CPT

## 2017-07-20 PROCEDURE — 97116 GAIT TRAINING THERAPY: CPT

## 2017-07-20 NOTE — PROGRESS NOTES
[facility] PHYSICAL THERAPY  [address] [place] [address] 51, [facility] [address],[facility], [address] [facility] - Phone: [phone]  Fax: [phone]  PROGRESS NOTE  Patient Name: Harjinder Arroyo : 1975   Treatment/Medical Diagnosis: Right foot drop [M21.371]   Referral Source: Dodie Teixeira*     Date of Initial Visit: 2017 Attended Visits: 13 Missed Visits: 2     SUMMARY OF TREATMENT  PT has consisted of initial evaluation, therapeutic exercises, therapeutic activities, HEP, manual therapy, patient education, and modalities. CURRENT STATUS  Pt presented to [facility] PT w/ subjective c/o (R) foot drop w/ chronic MS. Pt reporting pain as non-issue. States she is ambulating around the house w/ alternating use of RW and SPC. Pt is able to demonstrate gait of ~25' in clinic with use of SPC, good (R) foot clearance. As patient fatigues throughout session, (R) foot clearance decreases. Current (R) foot DF strength = 3+/5 on first attempt, unable to actively DF following 1st attempt. knee ext -35 deg, knee flex -19 deg. Tinetti = 24, FOTO = 37, GROC = +5. Goal/Measure of Progress Goal Met? 1. Pt will improve FOTO score to >/= 60 to demo a significant improvement in functional activity tolerance. Status at last Eval: 46 @ IE  Current Status: 37 current no   2. Pt will achieve >/= +6 GROC in order to promote increased activity tolerance. Status at last Eval: +4  Current Status: +5 progressing   3. Pt will increase Tinetti to >/= 23/28 in order to promote reduced risk for fall. Status at last Eval:  Current Status:  yes     4, Pt will ambulate community distances with least restrictive AD, and good Right foot clearance to reduce risk for fall. Status at last Eval: Ambulating 100' w/ Rollator, fair (R) foot clearance Current Status: Ambulating 25' w/ SPC, good (R) clearance. progressing     New Goals to be achieved in __2-4__  weeks:  1.  Pt will improve FOTO score to >/= 60 to demo a significant improvement in functional activity tolerance. 2. Pt will achieve >/= +6 GROC in order to promote increased activity tolerance. 3. Pt will ambulate community distances with least restrictive AD, and good Right foot clearance to reduce risk for fall. RECOMMENDATIONS  Pt to continue 2-3x weekly for up to an additional 4 weeks to meet remaining goals and be discharged to long-term HEP. Thank you for this referral.   If you have any questions/comments please contact us directly at [phone]. Thank you for allowing us to assist in the care of your patient. LPTA Signature: Walter Cuevas PTA  Date: 7/20/2017   PT Signature:  Time: 12:22 PM   NOTE TO PHYSICIAN:  PLEASE COMPLETE THE ORDERS BELOW AND FAX TO   [facility] [place] [facility]: [phone]  If you are unable to process this request in 24 hours please contact our office: [phone]    ___ I have read the above report and request that my patient continue as recommended.   ___ I have read the above report and request that my patient continue therapy with the following changes/special instructions:_________________________________________________________   ___ I have read the above report and request that my patient be discharged from therapy.      Physician Signature:        Date:       Time:

## 2017-07-20 NOTE — PROGRESS NOTES
PHYSICAL THERAPY - DAILY TREATMENT NOTE    Patient Name: Shalonda Solorzano        Date: 2017  : 1975   YES Patient  Verified  Visit #:   15   of   18  Insurance: Payor: Adolph Patel / Plan: Tami York PPO / Product Type: PPO /      In time: 840 Out time: 940   Total Treatment Time: 60     Medicare Time Tracking (below)   Total Timed Codes (min):  55 1:1 Treatment Time:       TREATMENT AREA =  Right foot drop [M21.371]    SUBJECTIVE  Pain Level (on 0 to 10 scale):  0  / 10   Medication Changes/New allergies or changes in medical history, any new surgeries or procedures? NO    If yes, update Summary List   Subjective Functional Status/Changes:  []  No changes reported     Pain not an issue per pt. No reports of recent falls or LOB. OBJECTIVE  Modalities Rationale:     increase muscle contraction/control to improve patient's ability to ambulate. 10 min [x] Estim, type/location: [place], (R) ant tib. 10:20                                     []  att     [x]  unatt     []  w/US     []  w/ice    []  w/heat    min []  Mechanical Traction: type/lbs                   []  pro   []  sup   []  int   []  cont    []  before manual    []  after manual    min []  Ultrasound, settings/location:      min []  Iontophoresis w/ dexamethasone, location:                                               []  take home patch       []  in clinic    min []  Ice     []  Heat    location/position:     min []  Vasopneumatic Device, press/temp:     min []  Other:    [x] Skin assessment post-treatment (if applicable):    [x]  intact    []  redness- no adverse reaction     []redness  adverse reaction:        35 min Therapeutic Exercise:  [x]  See flow sheet   Rationale:      increase ROM, increase strength, improve coordination and improve balance to improve the patients ability to ambulate. 10 min Gait Training: amb x25' w/ SPC, SBA.      Rationale:       min Patient Education:  YES  Reviewed HEP   []  Progressed/Changed HEP based on: Other Objective/Functional Measures:    Tinetti = 24/28  FOTO = 37  GROC = +5    (R) foot DF = Knee ext -35 deg, knee flex -19 deg. (R) foot DF strength = 3+/5 on first attempt, unable to actively DF following 1st attempt. Ambulation w/ SPC x25' in clinic. Post Treatment Pain Level (on 0 to 10) scale:   0  / 10     ASSESSMENT  Assessment/Changes in Function:     See note     []  See Progress Note/Recertification   Patient will continue to benefit from skilled PT services to modify and progress therapeutic interventions, address functional mobility deficits, address ROM deficits, address strength deficits, analyze and address soft tissue restrictions, analyze and cue movement patterns and analyze and modify body mechanics/ergonomics to attain remaining goals. Progress toward goals / Updated goals:    See note     PLAN  [x]  Upgrade activities as tolerated YES Continue plan of care   []  Discharge due to :    []  Other:      Therapist: Lulu Hinds PTA    Date: 7/20/2017 Time: 9:15 AM     Future Appointments  Date Time Provider Adelaida Harper   7/25/2017 8:30 AM Lulu Hinds PTA [facility]   7/27/2017 8:30 AM Lulu Hinds PTA [facility]   8/1/2017 8:30 AM Porsha Viveros [facility]   8/3/2017 8:30 AM Lulu Hinds [facility]   10/19/2017 11:15 AM Jose D Erickson MD [address] [facility]

## 2017-07-25 ENCOUNTER — HOSPITAL ENCOUNTER (OUTPATIENT)
Dept: PHYSICAL THERAPY | Age: 42
Discharge: HOME | End: 2017-07-25
Payer: COMMERCIAL

## 2017-07-25 PROCEDURE — 97110 THERAPEUTIC EXERCISES: CPT

## 2017-07-25 PROCEDURE — 97014 ELECTRIC STIMULATION THERAPY: CPT

## 2017-07-25 NOTE — PROGRESS NOTES
PHYSICAL THERAPY - DAILY TREATMENT NOTE    Patient Name: Michelle Mckeon        Date: 2017  : 1975   YES Patient  Verified  Visit #:   15   of   18  Insurance: Payor: Jasmyn Tapia / Plan: Brittani Amaral PPO / Product Type: PPO /      In time: 855 Out time: 930   Total Treatment Time: 35     Medicare Time Tracking (below)   Total Timed Codes (min):  35 1:1 Treatment Time:       TREATMENT AREA =  Right foot drop [M21.371]    SUBJECTIVE  Pain Level (on 0 to 10 scale):  0  / 10   Medication Changes/New allergies or changes in medical history, any new surgeries or procedures? NO    If yes, update Summary List   Subjective Functional Status/Changes:  []  No changes reported     Pt arrived 22' late for scheduled appointments. Informed that we would have to do a modified session. OBJECTIVE  Modalities Rationale:     increase muscle contraction/control to improve patient's ability to ambulate. 10 min [x] Estim, type/location: [facility], 10:20                                     []  att     [x]  unatt     []  w/US     []  w/ice    []  w/heat    min []  Mechanical Traction: type/lbs                   []  pro   []  sup   []  int   []  cont    []  before manual    []  after manual    min []  Ultrasound, settings/location:      min []  Iontophoresis w/ dexamethasone, location:                                               []  take home patch       []  in clinic    min []  Ice     []  Heat    location/position:     min []  Vasopneumatic Device, press/temp:     min []  Other:    [x] Skin assessment post-treatment (if applicable):    [x]  intact    []  redness- no adverse reaction     []redness  adverse reaction:        15 min Therapeutic Exercise:  [x]  See flow sheet   Rationale:      increase ROM, increase strength, improve coordination and improve balance to improve the patients ability to ambulate. 10 Min Therapeutic Activity: Gait and balance activities.     Rationale:    increase strength, improve coordination and improve balance to improve the patients ability to ambulate. min Patient Education:  YES  Reviewed HEP   []  Progressed/Changed HEP based on: Other Objective/Functional Measures:    Ambulation w/ SPC x100', SBA, LOB x2 w/ pt able to self correct w/ UE assist.      Post Treatment Pain Level (on 0 to 10) scale:   0  / 10     ASSESSMENT  Assessment/Changes in Function:     Gradual improvement in gait independence. []  See Progress Note/Recertification   Patient will continue to benefit from skilled PT services to modify and progress therapeutic interventions, address functional mobility deficits, address ROM deficits, address strength deficits, analyze and address soft tissue restrictions, analyze and cue movement patterns, analyze and modify body mechanics/ergonomics and assess and modify postural abnormalities to attain remaining goals. Progress toward goals / Updated goals:    Progressing toward LTG #4. PLAN  [x]  Upgrade activities as tolerated YES Continue plan of care   []  Discharge due to :    []  Other:      Therapist: Jose Najera PTA    Date: 7/25/2017 Time: 9:01 AM     Future Appointments  Date Time Provider Adelaida Harper   7/27/2017 8:30 AM Jose Najera PTA [facility]   8/1/2017 8:30 AM Ulysses Laundry D. Lonn Doheny [facility]   8/3/2017 8:30 AM Jose Najera PTA [facility]   10/19/2017 11:15 AM Woody Byrd MD [address] [facility]

## 2017-07-27 ENCOUNTER — HOSPITAL ENCOUNTER (OUTPATIENT)
Dept: PHYSICAL THERAPY | Age: 42
Discharge: HOME | End: 2017-07-27
Payer: COMMERCIAL

## 2017-07-27 PROCEDURE — 97110 THERAPEUTIC EXERCISES: CPT

## 2017-07-27 PROCEDURE — 97530 THERAPEUTIC ACTIVITIES: CPT

## 2017-07-27 PROCEDURE — 97014 ELECTRIC STIMULATION THERAPY: CPT

## 2017-07-27 NOTE — PROGRESS NOTES
PHYSICAL THERAPY - DAILY TREATMENT NOTE    Patient Name: Alexsandra Garcia        Date: 2017  : 1975   YES Patient  Verified  Visit #:     Insurance: Payor: Wilfredo Lopez / Plan: Merrill Diaz PPO / Product Type: PPO /      In time: 830 Out time: 925   Total Treatment Time: 55     Medicare Time Tracking (below)   Total Timed Codes (min):  55 1:1 Treatment Time:       TREATMENT AREA =  Right foot drop [M21.371]    SUBJECTIVE  Pain Level (on 0 to 10 scale):  0  / 10   Medication Changes/New allergies or changes in medical history, any new surgeries or procedures? NO    If yes, update Summary List   Subjective Functional Status/Changes:  []  No changes reported     No pain. No recent falls or LOB. 50/50 use of SPC/Rollator. OBJECTIVE  Modalities Rationale:     increase muscle contraction/control to improve patient's ability to ambulate. 10 min [x] Estim, type/location: [facility], 10:20, (R) ant tib. []  att     [x]  unatt     []  w/US     []  w/ice    []  w/heat    min []  Mechanical Traction: type/lbs                   []  pro   []  sup   []  int   []  cont    []  before manual    []  after manual    min []  Ultrasound, settings/location:      min []  Iontophoresis w/ dexamethasone, location:                                               []  take home patch       []  in clinic    min []  Ice     []  Heat    location/position:     min []  Vasopneumatic Device, press/temp:     min []  Other:    [x] Skin assessment post-treatment (if applicable):    [x]  intact    []  redness- no adverse reaction     []redness  adverse reaction:        30 min Therapeutic Exercise:  [x]  See flow sheet   Rationale:      increase ROM, increase strength, improve coordination and improve balance to improve the patients ability to ambulate. 15 Min Therapeutic Activity: Balance and gait activities per flow sheet.     Rationale:    increase strength, improve coordination, improve balance and increase proprioception to improve the patients ability to ambulate. min Patient Education:  YES  Reviewed HEP   []  Progressed/Changed HEP based on: Other Objective/Functional Measures: Therex per flow sheet. Able to ambulate ~100' w/ SPC. Post Treatment Pain Level (on 0 to 10) scale:   0  / 10     ASSESSMENT  Assessment/Changes in Function:     Pt demonstrating improvement in gait endurance. []  See Progress Note/Recertification   Patient will continue to benefit from skilled PT services to modify and progress therapeutic interventions, address functional mobility deficits, address ROM deficits, address strength deficits, analyze and address soft tissue restrictions, analyze and cue movement patterns, analyze and modify body mechanics/ergonomics and assess and modify postural abnormalities to attain remaining goals. Progress toward goals / Updated goals:    Progressing toward LTG #3. PLAN  [x]  Upgrade activities as tolerated YES Continue plan of care   []  Discharge due to :    []  Other:      Therapist: Gamal Israel PTA    Date: 7/27/2017 Time: 8:46 AM     Future Appointments  Date Time Provider Adelaida Harper   8/1/2017 8:30 AM Siobhan Champion [facility]   8/3/2017 8:30 AM Gamal Israel PTA [facility]   10/19/2017 11:15 AM Indira Villaseñor MD [address] [facility]

## 2017-08-01 ENCOUNTER — APPOINTMENT (OUTPATIENT)
Dept: PHYSICAL THERAPY | Age: 42
End: 2017-08-01
Payer: COMMERCIAL

## 2017-08-03 ENCOUNTER — HOSPITAL ENCOUNTER (OUTPATIENT)
Dept: PHYSICAL THERAPY | Age: 42
Discharge: HOME | End: 2017-08-03
Payer: COMMERCIAL

## 2017-08-03 PROCEDURE — 97116 GAIT TRAINING THERAPY: CPT

## 2017-08-03 PROCEDURE — 97014 ELECTRIC STIMULATION THERAPY: CPT

## 2017-08-03 PROCEDURE — 97110 THERAPEUTIC EXERCISES: CPT

## 2017-08-03 NOTE — PROGRESS NOTES
PHYSICAL THERAPY - DAILY TREATMENT NOTE    Patient Name: Rafaela Rodas        Date: 8/3/2017  : 1975   YES Patient  Verified  Visit #:     Insurance: Payor: Mer Swain / Plan: Dameon Mcknight PPO / Product Type: PPO /      In time: 845 Out time: 925   Total Treatment Time: 40     Medicare Time Tracking (below)   Total Timed Codes (min):  40 1:1 Treatment Time:       TREATMENT AREA =  Right foot drop [M21.371]    SUBJECTIVE  Pain Level (on 0 to 10 scale):  0  / 10   Medication Changes/New allergies or changes in medical history, any new surgeries or procedures? NO    If yes, update Summary List   Subjective Functional Status/Changes:  []  No changes reported     \"I've used my cane only for the last 3 days. I've been helping someone and have needed my hands to be more free. \"          OBJECTIVE  Modalities Rationale:     increase muscle contraction/control  to improve patient's ability to perform pain free ADLs. 10 min [x] Estim, type/location: [facility], 10:20, (R) ant tib. []  att     [x]  unatt     []  w/US     []  w/ice    []  w/heat    min []  Mechanical Traction: type/lbs                   []  pro   []  sup   []  int   []  cont    []  before manual    []  after manual    min []  Ultrasound, settings/location:      min []  Iontophoresis w/ dexamethasone, location:                                               []  take home patch       []  in clinic    min []  Ice     []  Heat    location/position:     min []  Vasopneumatic Device, press/temp:     min []  Other:    [x] Skin assessment post-treatment (if applicable):    [x]  intact    []  redness- no adverse reaction     []redness  adverse reaction:        20 min Therapeutic Exercise:  [x]  See flow sheet   Rationale:      increase ROM, increase strength, improve coordination and improve balance to improve the patients ability to ambulate.       10 Min Gait Training: See objective   Rationale:       min Patient Education:  YES  Reviewed HEP   []  Progressed/Changed HEP based on: Other Objective/Functional Measures:    Ambulation w/ SPC in (L) UE, SBA/CGA, VC's for reciprocal gait pattern.  ~125' ambulation. LOB x2, CGA w/ pt able to self correct. Post Treatment Pain Level (on 0 to 10) scale:   0  / 10     ASSESSMENT  Assessment/Changes in Function:     Pt demonstrating gradual improvement in endurance. SPC in (L) UE - pt demonstrating less toe drag w/ (R) LE during gait. []  See Progress Note/Recertification   Patient will continue to benefit from skilled PT services to modify and progress therapeutic interventions, address functional mobility deficits, address ROM deficits, address strength deficits, analyze and address soft tissue restrictions, analyze and cue movement patterns, analyze and modify body mechanics/ergonomics, assess and modify postural abnormalities and address imbalance/dizziness to attain remaining goals. Progress toward goals / Updated goals:    No change in progress toward LTG's with today's session.        PLAN  [x]  Upgrade activities as tolerated YES Continue plan of care   []  Discharge due to :    []  Other:      Therapist: Howard Sanchez PTA    Date: 8/3/2017 Time: 8:49 AM     Future Appointments  Date Time Provider Adealida Harper   10/19/2017 11:15 AM Kiley Crook MD [address] [facility]

## 2017-08-15 ENCOUNTER — APPOINTMENT (OUTPATIENT)
Dept: PHYSICAL THERAPY | Age: 42
End: 2017-08-15
Payer: COMMERCIAL

## 2017-08-21 ENCOUNTER — HOSPITAL ENCOUNTER (OUTPATIENT)
Dept: PHYSICAL THERAPY | Age: 42
Discharge: HOME | End: 2017-08-21
Payer: COMMERCIAL

## 2017-08-21 PROCEDURE — 97014 ELECTRIC STIMULATION THERAPY: CPT

## 2017-08-21 PROCEDURE — 97110 THERAPEUTIC EXERCISES: CPT

## 2017-08-21 NOTE — PROGRESS NOTES
PHYSICAL THERAPY - DAILY TREATMENT NOTE    Patient Name: Daina Oreilly        Date: 2017  : 1975   YES Patient  Verified  Visit #:     Insurance: Payor: Ekta Kuo / Plan: Susanne Burgess PPO / Product Type: PPO /      In time: 410 Out time: 455   Total Treatment Time: 45     Medicare Time Tracking (below)   Total Timed Codes (min):  45 1:1 Treatment Time:       TREATMENT AREA =  Right foot drop [M21.371]    SUBJECTIVE  Pain Level (on 0 to 10 scale):  0  / 10   Medication Changes/New allergies or changes in medical history, any new surgeries or procedures? NO    If yes, update Summary List   Subjective Functional Status/Changes:  []  No changes reported     \"I was trying to get my wheelchair. into the car and I slipped, fell onto my butt in the grass. \"  Pt reports no pain from the incident, able to get up on her own power. OBJECTIVE  Modalities Rationale:     increase muscle contraction/control to improve patient's ability to perform pain free ADLs. 12 min [x] Estim, type/location: [place]: 10:20, seated (R) ant tib. []  att     []  unatt     []  w/US     []  w/ice    []  w/heat    min []  Mechanical Traction: type/lbs                   []  pro   []  sup   []  int   []  cont    []  before manual    []  after manual    min []  Ultrasound, settings/location:      min []  Iontophoresis w/ dexamethasone, location:                                               []  take home patch       []  in clinic    min []  Ice     []  Heat    location/position:     min []  Vasopneumatic Device, press/temp:     min []  Other:    [x] Skin assessment post-treatment (if applicable):    [x]  intact    []  redness- no adverse reaction     []redness  adverse reaction:        33 min Therapeutic Exercise:  [x]  See flow sheet   Rationale:      increase ROM, increase strength, improve coordination and improve balance to improve the patients ability to ambulate.        min Patient Education:  YES  Reviewed HEP   []  Progressed/Changed HEP based on: Other Objective/Functional Measures:    FOTO = 37   GROC = +6     (R) ankle DF = 3+/5      Post Treatment Pain Level (on 0 to 10) scale:   0  / 10     ASSESSMENT  Assessment/Changes in Function:     No exacerbation of pain with today's session. No improvement noted in (R) DF strength. []  See Progress Note/Recertification   Patient will continue to benefit from skilled PT services to modify and progress therapeutic interventions, address functional mobility deficits, address ROM deficits, address strength deficits, analyze and address soft tissue restrictions, analyze and cue movement patterns, analyze and modify body mechanics/ergonomics and assess and modify postural abnormalities to attain remaining goals. Progress toward goals / Updated goals:    LTG #2 met.       PLAN  [x]  Upgrade activities as tolerated YES Continue plan of care   []  Discharge due to :    []  Other:      Therapist: Gamal Israel PTA    Date: 8/21/2017 Time: 4:11 PM     Future Appointments  Date Time Provider Adelaida Hraper   8/23/2017 5:30 PM May Margarito [facility]   8/28/2017 3:30 PM Gamal Israel PTA [facility]   8/30/2017 3:30 PM Tariq Falk PTA [facility]   9/6/2017 8:30 AM Tariq Falk PTA [facility]   9/8/2017 8:30 AM Tariq Falk PTA [facility]   9/11/2017 9:00 AM Tariq Falk PTA [facility]   9/13/2017 9:30 AM Tariq Falk PTA [facility]   10/19/2017 11:15 AM Indira Villaseñor MD [address]

## 2017-08-22 ENCOUNTER — APPOINTMENT (OUTPATIENT)
Dept: PHYSICAL THERAPY | Age: 42
End: 2017-08-22
Payer: COMMERCIAL

## 2017-08-24 ENCOUNTER — APPOINTMENT (OUTPATIENT)
Dept: PHYSICAL THERAPY | Age: 42
End: 2017-08-24
Payer: COMMERCIAL

## 2017-08-28 ENCOUNTER — HOSPITAL ENCOUNTER (OUTPATIENT)
Dept: PHYSICAL THERAPY | Age: 42
Discharge: HOME | End: 2017-08-28
Payer: COMMERCIAL

## 2017-08-28 PROCEDURE — 97110 THERAPEUTIC EXERCISES: CPT

## 2017-08-28 PROCEDURE — 97032 APPL MODALITY 1+ESTIM EA 15: CPT

## 2017-08-28 NOTE — PROGRESS NOTES
PHYSICAL THERAPY - DAILY TREATMENT NOTE    Patient Name: Estela Greenwood        Date: 2017  : 1975   YES Patient  Verified  Visit #:      of   18  Insurance: Payor: [employer] / Plan: Ric [employer] PPO / Product Type: PPO /      In time: 345 Out time: 430   Total Treatment Time: 45     Medicare Time Tracking (below)   Total Timed Codes (min):  45 1:1 Treatment Time:       TREATMENT AREA =  Right foot drop [M21.371]    SUBJECTIVE  Pain Level (on 0 to 10 scale):  0  / 10   Medication Changes/New allergies or changes in medical history, any new surgeries or procedures? NO    If yes, update Summary List   Subjective Functional Status/Changes:  []  No changes reported     Pt continues use of rollator for community distances, but sometimes uses the cane to get from car to house (shorter distances). OBJECTIVE  Modalities Rationale:     decrease pain and increase muscle contraction/control to improve patient's ability to perform pain free ADLs. 10 min [x] Estim, type/location: Seated, (R) ant tib. [place]. 10:20. [x]  att     []  unatt     []  w/US     []  w/ice    []  w/heat    min []  Mechanical Traction: type/lbs                   []  pro   []  sup   []  int   []  cont    []  before manual    []  after manual    min []  Ultrasound, settings/location:      min []  Iontophoresis w/ dexamethasone, location:                                               []  take home patch       []  in clinic    min []  Ice     []  Heat    location/position:     min []  Vasopneumatic Device, press/temp:     min []  Other:    [x] Skin assessment post-treatment (if applicable):    [x]  intact    []  redness- no adverse reaction     []redness  adverse reaction:        35 min Therapeutic Exercise:  [x]  See flow sheet   Rationale:      increase ROM, increase strength, improve coordination, improve balance and increase proprioception to improve the patients ability to ambulate. min Patient Education:  YES  Reviewed HEP   []  Progressed/Changed HEP based on: Other Objective/Functional Measures:    Ambulates 100' w/ SPC, SBA, good (R) LE clearance. Reports fatigue following gait.    (R) DF strength = 4-/5      Post Treatment Pain Level (on 0 to 10) scale:   0  / 10     ASSESSMENT  Assessment/Changes in Function:     See DC      []  See Progress Note/Recertification   Patient will continue to benefit from skilled PT services to   Progress toward goals / Updated goals:    See DC      PLAN  []  Upgrade activities as tolerated No Continue plan of care   [x]  Discharge due to :    []  Other:      Therapist: Yoel Pappas PTA    Date: 8/28/2017 Time: 4:01 PM     Future Appointments  Date Time Provider Adelaida Harper   8/30/2017 3:30 PM Steve Ahr, [facility]   9/6/2017 8:30 AM Steve Ahr, [facility]   9/8/2017 8:30 AM Steve Ahr, [facility]   9/11/2017 9:00 AM Steve Ahr, [facility]   9/13/2017 9:30 AM Steve Ahr, [facility]   10/19/2017 11:15 AM Deangelo Munson MD [address]

## 2017-08-28 NOTE — PROGRESS NOTES
[address] [place] [address] PHYSICAL THERAPY   [facility], [facility] ,[facility], [address] [facility] - Phone: [phone]  Fax: [phone]  DISCHARGE SUMMARY  Patient Name: Gina Maloney : 1975   Treatment/Medical Diagnosis: Right foot drop [M21.371]   Referral Source: Court Amos*     Date of Initial Visit: 2017 Attended Visits: 18 Missed Visits: 5     SUMMARY OF TREATMENT  PT has consisted of initial evaluation, therapeutic exercises, HEP, manual therapy, patient education, and modalities. CURRENT STATUS  [facility] w/ subjective c/o (R) foot drop w/ chronic MS. Pt reporting pain as non-issue. Pt able to ambulate 100' w/ SPC at final visit. Demonstrating improvement in (R) foot clearance. Current (R) DF strength = 4-/5. Able to move into DF in a seated position multiple times prior to fatigue, indicating improvement in strength. Pt states she is using the cane for short ambulation distances, including out to her car. Pt is compliant with HEP exercises. Goal/Measure of Progress Goal Met? 1. Pt will improve FOTO score to >/= 60 to demo a significant improvement in functional activity tolerance. Status at last Eval: 46  @ IE  Current Status: 37 no   2. Pt will achieve >/= +6 GROC in order to promote increased activity tolerance. Status at last Eval: +5  Current Status: +6 yes   3. Pt will ambulate community distances with least restrictive AD, and good Right foot clearance to reduce risk for fall. Status at last Eval: Ambulating 25' w/ SPC, good (R) clearance  Current Status: Ambulates 100' w/ SPC, good (R) LE clearance yes     RECOMMENDATIONS  Discontinue therapy. Progressing towards or have reached established goals. If you have any questions/comments please contact us directly at [phone]. Thank you for allowing us to assist in the care of your patient.     LPTA Signature: Madison Torres, JOSE JUAN Date: 2017 Therapist Signature: AYDE Stoddard Time: 5:14 PM

## 2017-08-29 ENCOUNTER — APPOINTMENT (OUTPATIENT)
Dept: PHYSICAL THERAPY | Age: 42
End: 2017-08-29
Payer: COMMERCIAL

## 2017-08-30 ENCOUNTER — APPOINTMENT (OUTPATIENT)
Dept: PHYSICAL THERAPY | Age: 42
End: 2017-08-30
Payer: COMMERCIAL

## 2017-08-31 ENCOUNTER — APPOINTMENT (OUTPATIENT)
Dept: PHYSICAL THERAPY | Age: 42
End: 2017-08-31
Payer: COMMERCIAL

## 2017-09-06 ENCOUNTER — APPOINTMENT (OUTPATIENT)
Dept: PHYSICAL THERAPY | Age: 42
End: 2017-09-06

## 2017-09-08 ENCOUNTER — APPOINTMENT (OUTPATIENT)
Dept: PHYSICAL THERAPY | Age: 42
End: 2017-09-08

## 2017-09-11 ENCOUNTER — APPOINTMENT (OUTPATIENT)
Dept: PHYSICAL THERAPY | Age: 42
End: 2017-09-11

## 2017-09-13 ENCOUNTER — APPOINTMENT (OUTPATIENT)
Dept: PHYSICAL THERAPY | Age: 42
End: 2017-09-13

## 2020-04-23 PROBLEM — G35 MULTIPLE SCLEROSIS (HCC): Status: ACTIVE | Noted: 2019-08-23

## 2022-02-24 ENCOUNTER — APPOINTMENT (OUTPATIENT)
Dept: PHYSICAL THERAPY | Age: 47
End: 2022-02-24

## 2022-03-03 ENCOUNTER — HOSPITAL ENCOUNTER (OUTPATIENT)
Dept: PHYSICAL THERAPY | Age: 47
Discharge: HOME | End: 2022-03-03
Payer: COMMERCIAL

## 2022-03-03 PROCEDURE — 97162 PT EVAL MOD COMPLEX 30 MIN: CPT

## 2022-03-03 NOTE — PROGRESS NOTES
PT DAILY TREATMENT NOTE/LYMPHLEG EVAL 10-18    Patient Name: Richard Fernandes  TJQY:  : 1975  [x]  Patient  Verified  Payor: Les [employer] / Plan: [address] [place] [address] HMO / Product Type: Managed Care Medicare /    In time:1217  Out time:1258  Total Treatment Time (min): 41  Visit #: 1 of 8    Medicare/BCBS Only   Total Timed Codes (min):  0 1:1 Treatment Time:  41       Treatment Area: Right leg weakness [R29.898]  Left leg weakness [R29.898]  Lymphedema [I89.0]    SUBJECTIVE  Pain Level (0-10 scale): 0/10  []constant []intermittent [x]improving []worsening []no change since onset    Any medication changes, allergies to medications, adverse drug reactions, diagnosis change, or new procedure performed?: [x] No    [] Yes (see summary sheet for update)  Subjective functional status/changes:     PLOF: functionally independent, no AD, sedentary lifestyle  Limitations to PLOF: difficulty with dressing, difficulty with wearing shoes, difficulty with walking,    Current symptoms/Complaints: 0/10 at best; 3/10 at worst; pt reports they are able to sleep through the night secondary to pain  PMHx/Surgical Hx: MS, HTN, Gastric bypass, gull stone surgery    Lymphedema History: [] Primary [x] Secondary Cause: pt Is diagnosised with MS in , and has noticed increased in fluid retention. she was diagnosised with lymphedema by podiatrist in /2021. Pt has 30-40 leggings and has velcro wraps.  Pt has a lymphatic pump from [employer].  Work Hx: retired  Living Situation: lives with   Pt Goals: \"help control lymph fluid\"  Barriers: []pain []financial []time []transportation []other  Motivation: good  Substance use: []Alcohol []Tobacco []other:   Cognition: A & O x 3        OBJECTIVE    41 min [x]Eval                  []Re-Eval          With   [] TE   [] TA   [] neuro   [x] other: Patient Education: [x] Review HEP    [] Progressed/Changed HEP based on:   [] positioning   [] body mechanics   [] transfers   [] heat/ice application    [x] other: lymphedema control and anatomy/physiology       General Evaluation    Skin inspection: [x] Fibrosis, [] Hyperkeratosis, [] Fungal infection, [] Papillomas, [] Lymphorrhea, [] wounds, [x] color changes: [] Red or [x] brown, [] pitting   Lateral right leg fibrotic tissue,  Dry skin noted John LE. Gait: unable today due to spasticity  Stairs:unable today due to spasticity   Posture: rounded shoulders anterior trunk lean     ROM:                                        AROM     Knee Left Right   Extension [facility] WFL   Flexion limited limited             AROM                             Hip Left Right   Flexion Limited  Limited    ABD Limited  limited                                            AROM         Ankle Left Right   Plantarflexion WFL WFL   dorsiFlexion limited limited          Strength (MMT):                                            Hip Left (1-5) Right (1-5)   Hip Flexion 3 2   Hip ABD 3 2     Knee Left (1-5) Right (1-5)   Knee Flexion 2 2   Knee Extension 3 3   Ankle DF 3 2   Other         Circumference measurements Left (cm) Right (cm)   Big toe 9.2 9.8   Forefoot  25 25.2   Figure 8 63.3 64.8   Malleoli  33.7 34.5   10 cm proximal to malleoli 33.5 32   20 cm proximal to malleoli 43 46.8   10 cm distal to knee 48.5 49.5   Patella  48 52.5     Pain Level (0-10 scale) post treatment: 0/10    ASSESSMENT/Changes in Function: 41 yo female who presents to [facility] with c/o John leg lymphedema. Patient is s/p diagnosis of MS in 2015. Pt reports that she gradually noticed an increase in swelling in john LE since then. Patient reports that she went to her podatrist in June/july 2021 and was told that she has lymphedema. Pt is now being referred for services. Pt reports that she has a lymphatic pump from [employer].  She reports that the pump works well to decrease the fluid.    She reports that she has velcro garments however she does not use them since they are too hot. Pt reports that she has tried to use compression hose however they caused her more pain due to her spasticity. Pt has found a pair of leggings that she likes and works well to control her edema. Pt is interested in anything that she can do to help the lymphedema and control it. Pt was educated on lymphedema and lymphatic anatomy and physiology. Feel that due to pts good set up at home with pump and a fair amount of compression garments, Pt would benefit from MLD and education on self MLD for home and would not benefit from compression bandaging at this time. Patient demonstrates decreased ROM, decreased strength, impaired posture, pain and decreased functional mobility tolerance. Patient will continue to benefit from skilled PT services to modify and progress therapeutic interventions, analyze and address soft tissue restrictions and decrease fliud retension and improve tissue healing to attain remaining goals. [x]  See Plan of Care  []  See progress note/recertification  []  See Discharge Summary         Progress towards goals / Updated goals:  Short Term Goals: To be accomplished in 2 weeks:  1. Patient will be independent and compliant with HEP to progress toward goals and restore functional mobility. Eval Status: issued at eval    2. Patient will improve FOTO score by 1/2 points to improve functional tolerance for exercise. Eval Status: FOTO to be taken at visit 2  FOTO score = an established functional score where 100 = no disability    3. Pt will have 3/5 John LE hip flexion strength to return to goals of improved lymphatic drainage and management. Eval Status:   Hip Left (1-5) Right (1-5)   Hip Flexion 3 2         4. Pt will have painfree knee flexion PROM to aid in functional mechanics for ADLs. Eval Status: John LE knee flexion limited.           6.  Pt will decrease Right ankle malleoli leg circumference measurement by 1 centimeters in order to improve donning shoes  Eval Status:   Circumference measurements Left (cm) Right (cm)   Malleoli  33.7 34.5              7.  Pt and/or caregiver will be able to return demo self MLD in order to facilitate improved fluid management long term. Eval status: to be given at future visit      Long Term Goals: To be accomplished in 4 weeks:  1. Patient will improve FOTO score by full points to improve functional tolerance for walking. Eval Status: FOTO to be taken at visit 2  FOTO score = an established functional score where 100 = no disability    3. Pt will have 3/5 pamela LE strength to return to goals of improved walking and standing tolerance. Eval Status:   Hip Left (1-5) Right (1-5)   Hip Flexion 3 2   Hip ABD 3 2     Knee Left (1-5) Right (1-5)   Knee Flexion 2 2   Knee Extension 3 3   Ankle DF 3 2             4.  Pt will be able to vimal/doff compression garment with mod ind in order to maintain fluid management. Eval status: Pt own compression hose at this time. 5.  Pt will be able to perform self MLD with independence in order to improve long term management of lymphedema.    Current: to be given at future visit     PLAN  [x]  Upgrade activities as tolerated     [x]  Continue plan of care  [x]  Update interventions per flow sheet       []  Discharge due to:_  []  Other:_      Wiliam Moser PT 3/3/2022  12:06 PM

## 2022-03-03 NOTE — PROGRESS NOTES
[facility] PHYSICAL THERAPY  [address] [facility], [address] - Phone: [phone]  Fax: [phone] / [address]  Patient Name: Racquel Ortiz : 1975   Medical   Diagnosis: Right leg weakness [R29.898]  Left leg weakness [R29.898]  Lymphedema [I89.0] Treatment Diagnosis: Right Leg weakness R29.898 Left leg weakness R29.898 Lymphedema I89.0   Onset Date:      Referral Source: Dami Dixon MD [facility]): 3/3/2022   Prior Hospitalization: See medical history Provider #: [id number]   Prior Level of Function: Using a rollator mostly, occasional WC for long distances and bad tonal days. Comorbidities: MS, HTN, Gastric bypass, gull stone surgery   Medications: Verified on Patient Summary List   The Plan of Care and following information is based on the information from the initial evaluation.   ==========================================================================================  Assessment / key information:  43 yo female who presents to [facility] with c/o Pamela leg lymphedema. Patient is s/p diagnosis of MS in . Pt reports that she gradually noticed an increase in swelling in pamela LE since then. Patient reports that she went to her podatrist in /2021 and was told that she has lymphedema. Pt is now being referred for services. Pt reports that she has a lymphatic pump from [employer].  She reports that the pump works well to decrease the fluid. She reports that she has velcro garments however she does not use them since they are too hot. Pt reports that she has tried to use compression hose however they caused her more pain due to her spasticity. Pt has found a pair of leggings that she likes and works well to control her edema. Pt is interested in anything that she can do to help the lymphedema and control it.   Pt was educated on lymphedema and lymphatic anatomy and physiology. Feel that due to pts good set up at home with pump and a fair amount of compression garments, Pt would benefit from MLD and education on self MLD for home and would not benefit from compression bandaging at this time. Patient demonstrates decreased ROM, decreased strength, impaired posture, pain and decreased functional mobility tolerance.     Patient will continue to benefit from skilled PT services to modify and progress therapeutic interventions, analyze and address soft tissue restrictions and decrease fliud retension and improve tissue healing to attain remaining goals. Eval Complexity: History: HIGH Complexity :3+ comorbidities / personal factors will impact the outcome/ POC Exam:HIGH Complexity : 4+ Standardized tests and measures addressing body structure, function, activity limitation and / or participation in recreation  Presentation: MEDIUM Complexity : Evolving with changing characteristics  Overall Complexity:MEDIUM    Problem List: decrease ROM, decrease strength, edema affecting function, impaired gait/ balance, decrease ADL/ functional abilitiies, decrease activity tolerance, decrease flexibility/ joint mobility and decrease transfer abilities   Treatment Plan may include any combination of the following: Therapeutic exercise, Therapeutic activities, Neuromuscular re-education, Physical agent/modality, Gait/balance training, Manual therapy, Patient education, Self Care training, Functional mobility training, Home safety training and Stair training  Patient / Family readiness to learn indicated by: asking questions, trying to perform skills and interest  Persons(s) to be included in education: patient (P) and family support person (FSP);list   Barriers to Learning/Limitations: None  Measures taken: NA   Patient Goal (s): help control lymph fluid   Patient self reported health status: good  Rehabilitation Potential: good  Short Term Goals:  To be accomplished in 2 weeks:  1. Patient will be independent and compliant with HEP to progress toward goals and restore functional mobility. Eval Status: issued at eval     2. Patient will improve FOTO score by 1/2 points to improve functional tolerance for exercise. Eval Status: FOTO to be taken at visit 2  FOTO score = an established functional score where 100 = no disability     3. Pt will have 3/5 John LE hip flexion strength to return to goals of improved lymphatic drainage and management. Eval Status:   Hip Left (1-5) Right (1-5)   Hip Flexion 3 2            4. Pt will have painfree knee flexion PROM to aid in functional mechanics for ADLs. Eval Status: John LE knee flexion limited.           6.   Pt will decrease Right ankle malleoli leg circumference measurement by 1 centimeters in order to improve donning shoes  Eval Status:   Circumference measurements Left (cm) Right (cm)   Malleoli  33.7 34.5               7.   Pt and/or caregiver will be able to return demo self MLD in order to facilitate improved fluid management long term. Eval status: to be given at future visit        Long Term Goals: To be accomplished in 4 weeks:  1. Patient will improve FOTO score by full points to improve functional tolerance for walking. Eval Status: FOTO to be taken at visit 2  FOTO score = an established functional score where 100 = no disability     3. Pt will have 3/5 john LE strength to return to goals of improved walking and standing tolerance. Eval Status:   Hip Left (1-5) Right (1-5)   Hip Flexion 3 2   Hip ABD 3 2      Knee Left (1-5) Right (1-5)   Knee Flexion 2 2   Knee Extension 3 3   Ankle DF 3 2               4. Pt will be able to vimal/doff compression garment with mod ind in order to maintain fluid management. Eval status: Pt own compression hose at this time.            5. Pt will be able to perform self MLD with independence in order to improve long term management of lymphedema. Current: to be given at future visit     Frequency / Duration:   Patient to be seen  2  times per week for 4  weeks:  Patient / Caregiver education and instruction: self care, activity modification and exercises    Therapist Signature: Abi Castellanos PT, DPT, CLT Date: 3/4/2645   Certification Period: 3/3/22-6/1/22 Time: 12:06 PM   ===========================================================================================  I certify that the above Physical Therapy Services are being furnished while the patient is under my care. I agree with the treatment plan and certify that this therapy is necessary. Physician Signature:        Date:       Time:     Please sign and return to [facility] or you may fax the signed copy to [phone]. Thank you.

## 2022-03-10 ENCOUNTER — HOSPITAL ENCOUNTER (OUTPATIENT)
Dept: PHYSICAL THERAPY | Age: 47
End: 2022-03-10
Payer: COMMERCIAL

## 2022-03-22 ENCOUNTER — HOSPITAL ENCOUNTER (OUTPATIENT)
Dept: PHYSICAL THERAPY | Age: 47
Discharge: HOME | End: 2022-03-22
Payer: COMMERCIAL

## 2022-03-22 PROCEDURE — 97530 THERAPEUTIC ACTIVITIES: CPT

## 2022-03-22 PROCEDURE — 97140 MANUAL THERAPY 1/> REGIONS: CPT

## 2022-03-22 PROCEDURE — 97110 THERAPEUTIC EXERCISES: CPT

## 2022-03-22 NOTE — PROGRESS NOTES
PT DAILY TREATMENT NOTE    Patient Name: Morelia Burger  Date:3/22/2022  : 1975  [x]  Patient  Verified  Payor: Kris Gutierrez / Plan: [address] [place] [address] HMO / Product Type: Managed Care Medicare /    In time:245  Out time:331  Total Treatment Time (min): 46  Total Timed Codes (min): 46  1:1 Treatment Time (MC/BCBS only): 55   Visit #: 2 of 8    Treatment Dx: Right leg weakness [R29.898]  Left leg weakness [R29.898]  Lymphedema [I89.0]    SUBJECTIVE  Pain Level (0-10 scale): 0/10  Any medication changes, allergies to medications, adverse drug reactions, diagnosis change, or new procedure performed?: [x] No    [] Yes (see summary sheet for update)  Subjective functional status/changes:   [] No changes reported  Pt reported that she has been using her compression garments daily. OBJECTIVE    20 min Therapeutic Exercise:  [] See flow sheet :   Rationale: improve fluid management  to improve the patients ability to manage swelling and dressing and dressing\    16 min Therapeutic Activity:  []  See flow sheet :   Rationale: improve fluid management  to improve the patients ability to manage swelling and dressing and dressing    10 min Manual Therapy:  MLD to swoll spots and lower limb John due to pt not being able to reach to lower legs. Rationale: decrease pain, increase ROM, increase tissue extensibility and decrease edema  to improve the patients ability to restore PLOF  The manual therapy interventions were performed at a separate and distinct time from the therapeutic activities interventions. With   [] TE   [x] TA   [] neuro   [] other: Patient Education: [x] Review HEP    [x] Progressed/Changed HEP based on:   [] positioning   [] body mechanics   [] transfers   [] heat/ice application    [] other:      Pain Level (0-10 scale) post treatment: 0/10    ASSESSMENT/Changes in Function: Patient tolerated treatment session well today.  Patient had no complaints with addition of Self MLD for home use to exercise program to accomplish improved fluid drainage. Pt educated to use Self MLD for core and upper thigh prior to using pump to increase effectiveness of pump. Patient continues to make steady progress toward goals and would benefit from continued skilled PT intervention to address remaining deficits outlined in goals below. Patient will continue to benefit from skilled PT services to modify and progress therapeutic interventions, analyze and address soft tissue restrictions and decrease fliud retension and improve tissue healing. to attain remaining goals. [x]  See Plan of Care  []  See progress note/recertification  []  See Discharge Summary         Progress towards goals / Updated goals:  Short Term Goals: To be accomplished in 2 weeks:  1. Patient will be independent and compliant with HEP to progress toward goals and restore functional mobility. Eval Status: issued at eval  Current: given Self MLD today 3/22/22     2. Patient will improve FOTO score by 1/2 points to improve functional tolerance for exercise. Eval Status: FOTO to be taken at visit 2  FOTO score = an established functional score where 100 = no disability     3. Pt will have 3/5 John LE hip flexion strength to return to goals of improved lymphatic drainage and management. Eval Status:   Hip Left (1-5) Right (1-5)   Hip Flexion 3 2            4. Pt will have painfree knee flexion PROM to aid in functional mechanics for ADLs. Eval Status: John LE knee flexion limited.           6.   Pt will decrease Right ankle malleoli leg circumference measurement by 1 centimeters in order to improve donning shoes  Eval Status:   Circumference measurements Left (cm) Right (cm)   Malleoli  33.7 34.5               7.   Pt and/or caregiver will be able to return demo self MLD in order to facilitate improved fluid management long term. Eval status: to be given at future visit        Long Term Goals:  To be accomplished in 4 weeks: 1. Patient will improve FOTO score by full points to improve functional tolerance for walking. Eval Status: FOTO to be taken at visit 2  FOTO score = an established functional score where 100 = no disability     3. Pt will have 3/5 pamela LE strength to return to goals of improved walking and standing tolerance. Eval Status:   Hip Left (1-5) Right (1-5)   Hip Flexion 3 2   Hip ABD 3 2      Knee Left (1-5) Right (1-5)   Knee Flexion 2 2   Knee Extension 3 3   Ankle DF 3 2               4. Pt will be able to vimal/doff compression garment with mod ind in order to maintain fluid management. Eval status: Pt own compression hose at this time.            5. Pt will be able to perform self MLD with independence in order to improve long term management of lymphedema.               Current: to be given at future visit      PLAN  []  Upgrade activities as tolerated     [x]  Continue plan of care  []  Update interventions per flow sheet       []  Discharge due to:_  []  Other:_      Chelsy Martinez PT 3/22/2022  1:50 PM    Future Appointments   Date Time Provider Adelaida Harper   3/22/2022  2:45 PM [address] [facility] [address]   3/24/2022  2:45 PM [address] [facility] [address]   3/29/2022  2:45 PM [address] [facility] [address]   3/31/2022  2:45 PM [address] PT [address]   7/26/2022 11:00 AM Garth Sullivan, [place]

## 2022-03-24 ENCOUNTER — HOSPITAL ENCOUNTER (OUTPATIENT)
Dept: PHYSICAL THERAPY | Age: 47
Discharge: HOME | End: 2022-03-24
Payer: COMMERCIAL

## 2022-03-24 PROCEDURE — 97530 THERAPEUTIC ACTIVITIES: CPT

## 2022-03-24 PROCEDURE — 97140 MANUAL THERAPY 1/> REGIONS: CPT

## 2022-03-24 NOTE — PROGRESS NOTES
PT DAILY TREATMENT NOTE    Patient Name: Hansel Barnhart  Date:3/24/2022  : 1975  [x]  Patient  Verified  Payor: Viri Grim / Plan: [address] [place] [address] HMO / Product Type: Managed Care Medicare /    In time:312  Out time:355  Total Treatment Time (min): 43  Total Timed Codes (min): 43  1:1 Treatment Time (MC/BCBS only): 43   Visit #: 3 of 8    Treatment Dx: Right leg weakness [R29.898]  Left leg weakness [R29.898]  Lymphedema [I89.0]    SUBJECTIVE  Pain Level (0-10 scale): 2  Any medication changes, allergies to medications, adverse drug reactions, diagnosis change, or new procedure performed?: [x] No    [] Yes (see summary sheet for update)  Subjective functional status/changes:   [] No changes reported  Pt reports being stiff today and having increased issues with mobility    OBJECTIVE    12 min Therapeutic Activity:  []  See flow sheet : SPT x 2, education regarding long term compression garments. Rationale: improve fluid management  to improve the patients ability to manage swelling and dressing and dressing     31 min Manual Therapy:  MLD: long thoracic duct, superficial and deep abdominal sequences, axilla nodes Pamela, inguinal nodes Pamela, axial-inguinal anastomosis pamela, upper thigh lateral pamela, upper thigh medial ->lateral Pamela, knee, lower limb using semi circles, scoop and pump techniques. Extra time spent on lower limb around ankles where fibrotic tissues lay. Rationale: decrease pain, increase ROM, increase tissue extensibility and decrease edema  to improve the patients ability to restore PLOF  The manual therapy interventions were performed at a separate and distinct time from the therapeutic activities interventions.        With   [] TE   [x] TA   [] neuro   [] other: Patient Education: [x] Review HEP    [] Progressed/Changed HEP based on:   [] positioning   [] body mechanics   [] transfers   [] heat/ice application    [] other:     Pain Level (0-10 scale) post treatment: 0/10    ASSESSMENT/Changes in Function: Patient tolerated treatment session well today. Patient had no complaints with addition of Full MLD anterior only due to unable to roll over to exercise program to accomplish improved fluid rainage. Pt reported compliance with Self MLD and has noticed increased restroom usage. Pt continues to have increased fibrotic tissue along lower limbs around ankles. Patient continues to make steady progress toward goals and would benefit from continued skilled PT intervention to address remaining deficits outlined in goals below. Patient will continue to benefit from skilled PT services to modify and progress therapeutic interventions, analyze and address soft tissue restrictions and decrease fliud retension and improve tissue healing. to attain remaining goals. [x]  See Plan of Care  []  See progress note/recertification  []  See Discharge Summary         Progress towards goals / Updated goals:  Short Term Goals: To be accomplished in 2 weeks:  1. Patient will be independent and compliant with HEP to progress toward goals and restore functional mobility. Eval Status: issued at eval  Current: given Self MLD today 3/22/22     2. Patient will improve FOTO score by 1/2 points to improve functional tolerance for exercise. Eval Status: FOTO to be taken at visit 2  FOTO score = an established functional score where 100 = no disability     3. Pt will have 3/5 John LE hip flexion strength to return to goals of improved lymphatic drainage and management. Eval Status:   Hip Left (1-5) Right (1-5)   Hip Flexion 3 2            4. Pt will have painfree knee flexion PROM to aid in functional mechanics for ADLs.   Eval Status: John LE knee flexion limited.           6.   Pt will decrease Right ankle malleoli leg circumference measurement by 1 centimeters in order to improve donning shoes  Eval Status:   Circumference measurements Left (cm) Right (cm)   Malleoli  33.7 34.5               7.   Pt and/or caregiver will be able to return demo self MLD in order to facilitate improved fluid management long term.               Eval status: to be given at future visit  Current: pt was able to return demo upper body MLD and reported compliance at home. 3/24/22         Long Term Goals: To be accomplished in 4 weeks:  1. Patient will improve FOTO score by full points to improve functional tolerance for walking. Eval Status: FOTO to be taken at visit 2  FOTO score = an established functional score where 100 = no disability     3.   Pt will have 3/5 pamela LE strength to return to goals of improved walking and standing tolerance.   Eval Status:   Hip Left (1-5) Right (1-5)   Hip Flexion 3 2   Hip ABD 3 2      Knee Left (1-5) Right (1-5)   Knee Flexion 2 2   Knee Extension 3 3   Ankle DF 3 2               4.   Pt will be able to vimal/doff compression garment with mod ind in order to maintain fluid management.               Eval status: Pt own compression hose at this time.            5.   Pt will be able to perform self MLD with independence in order to improve long term management of lymphedema.              Current: to be given at future visit     PLAN  []  Upgrade activities as tolerated     [x]  Continue plan of care  []  Update interventions per flow sheet       []  Discharge due to:_  []  Other:_      Fatimah Goldstein PT 3/24/2022  3:14 PM    Future Appointments   Date Time Provider Adelaida Harper   3/29/2022  2:45 PM [facility] PT [address] [facility]   3/31/2022  2:45 PM [facility] PT [address] [facility]   4/25/2022 11:40 AM [facility] ALEN POD3 NURSE [facility] MARIAH [address]   5/3/2022 11:30 AM [address]   7/26/2022 11:00 AM [place], [place]

## 2022-03-29 ENCOUNTER — HOSPITAL ENCOUNTER (OUTPATIENT)
Dept: PHYSICAL THERAPY | Age: 47
Discharge: HOME | End: 2022-03-29
Payer: COMMERCIAL

## 2022-03-29 PROCEDURE — 97140 MANUAL THERAPY 1/> REGIONS: CPT

## 2022-03-29 NOTE — PROGRESS NOTES
PT DAILY TREATMENT NOTE    Patient Name: Juan Jaramillo  Date:3/29/2022  : 1975  [x]  Patient  Verified  Payor: Cameron Joséda / Plan: [address] [place] [address] HMO / Product Type: Managed Care Medicare /    In time:307  Out time:330  Total Treatment Time (min): 27  Total Timed Codes (min): 27  1:1 Treatment Time (MC/BCBS only): 27   Visit #: 4 of 8    Treatment Dx: Right leg weakness [R29.898]  Left leg weakness [R29.898]  Lymphedema [I89.0]    SUBJECTIVE  Pain Level (0-10 scale): /10  Any medication changes, allergies to medications, adverse drug reactions, diagnosis change, or new procedure performed?: [x] No    [] Yes (see summary sheet for update)  Subjective functional status/changes:   [] No changes reported  Pt reports that she is having a bad day for spasms     OBJECTIVE      27 min Manual Therapy:  MLD: long thoracic duct, superficial and deep abdominal sequences, axilla nodes Pamela, inguinal nodes Pamela, axial-inguinal anastomosis pamela, upper thigh lateral pamela, upper thigh medial ->lateral Pamela, knee, lower limb using semi circles, scoop and pump techniques. Extra time spent on lower limb around ankles where fibrotic tissues lay. Rationale: decrease pain, increase ROM, increase tissue extensibility and decrease edema  to improve the patients ability to restore PLOF  The manual therapy interventions were performed at a separate and distinct time from the therapeutic activities interventions. With   [] TE   [] TA   [] neuro   [] other: Patient Education: [x] Review HEP    [] Progressed/Changed HEP based on:   [] positioning   [] body mechanics   [] transfers   [] heat/ice application    [] other:      Pain Level (0-10 scale) post treatment: 2/10    ASSESSMENT/Changes in Function: Patient tolerated treatment session well today. PT reviewed MLD today. PT confirmed that pt did not require compression garments at this time. Pt reported daily compliance with pump and self MLD.   Patient continues to make steady progress toward goals and would benefit from continued skilled PT intervention to address remaining deficits outlined in goals below. Patient will continue to benefit from skilled PT services to modify and progress therapeutic interventions, analyze and address soft tissue restrictions and decrease fliud retension and improve tissue healing. to attain remaining goals. [x]  See Plan of Care  []  See progress note/recertification  []  See Discharge Summary         Progress towards goals / Updated goals:  Short Term Goals: To be accomplished in 2 weeks:  1. Patient will be independent and compliant with HEP to progress toward goals and restore functional mobility. Eval Status: issued at eval  Current: given Self MLD today 3/22/22     2. Patient will improve FOTO score by 1/2 points to improve functional tolerance for exercise. Eval Status: FOTO to be taken at visit 2  FOTO score = an established functional score where 100 = no disability  Current: discontinued Not taken 3/29/22     3. Pt will have 3/5 John LE hip flexion strength to return to goals of improved lymphatic drainage and management. Eval Status:   Hip Left (1-5) Right (1-5)   Hip Flexion 3 2            4. Pt will have painfree knee flexion PROM to aid in functional mechanics for ADLs. Eval Status: John LE knee flexion limited.           6.   Pt will decrease Right ankle malleoli leg circumference measurement by 1 centimeters in order to improve donning shoes  Eval Status:   Circumference measurements Left (cm) Right (cm)   Malleoli  33.7 34.5               7.   Pt and/or caregiver will be able to return demo self MLD in order to facilitate improved fluid management long term.               Eval status: to be given at future visit  Current: pt was able to return demo upper body MLD and reported compliance at home. 3/24/22         Long Term Goals: To be accomplished in 4 weeks:  1.  Patient will improve FOTO score by full points to improve functional tolerance for walking. Eval Status: FOTO to be taken at visit 2  FOTO score = an established functional score where 100 = no disability     3.   Pt will have 3/5 pamela LE strength to return to goals of improved walking and standing tolerance.   Eval Status:   Hip Left (1-5) Right (1-5)   Hip Flexion 3 2   Hip ABD 3 2      Knee Left (1-5) Right (1-5)   Knee Flexion 2 2   Knee Extension 3 3   Ankle DF 3 2               4.   Pt will be able to vimal/doff compression garment with mod ind in order to maintain fluid management.               Eval status: Pt own compression hose at this time.            5.   Pt will be able to perform self MLD with independence in order to improve long term management of lymphedema.              Current: to be given at future visit      PLAN  []  Upgrade activities as tolerated     [x]  Continue plan of care  []  Update interventions per flow sheet       []  Discharge due to:_  []  Other:_      Marcella Spears, PT 3/29/2022  11:27 AM    Future Appointments   Date Time Provider Adelaida Harper   3/29/2022  2:45 PM [facility] PT [address] [facility]   3/31/2022  2:45 PM [facility] PT [address] [facility]   4/25/2022 11:40 AM [address]   5/3/2022 11:30 AM Richard OLIVAREZ [facility]   7/26/2022 11:00 AM [facility],  [facility]

## 2022-03-31 ENCOUNTER — HOSPITAL ENCOUNTER (OUTPATIENT)
Dept: PHYSICAL THERAPY | Age: 47
Discharge: HOME | End: 2022-03-31
Payer: COMMERCIAL

## 2022-03-31 PROCEDURE — 97110 THERAPEUTIC EXERCISES: CPT

## 2022-03-31 PROCEDURE — 97530 THERAPEUTIC ACTIVITIES: CPT

## 2022-03-31 PROCEDURE — 97140 MANUAL THERAPY 1/> REGIONS: CPT

## 2022-03-31 NOTE — PROGRESS NOTES
PT DAILY TREATMENT NOTE    Patient Name: Oneida Ferrer  Date:3/31/2022  : 1975  [x]  Patient  Verified  Payor: Kris King / Plan: [address] [place] [address] HMO / Product Type: Managed Care Medicare /    In time:305  Out time:355  Total Treatment Time (min): 50  Total Timed Codes (min): 50  1:1 Treatment Time (MC/BCBS only): 50   Visit #: 5 of 8    Treatment Dx: Right leg weakness [R29.898]  Left leg weakness [R29.898]  Lymphedema [I89.0]    SUBJECTIVE  Pain Level (0-10 scale): 0/10  Any medication changes, allergies to medications, adverse drug reactions, diagnosis change, or new procedure performed?: [x] No    [] Yes (see summary sheet for update)  Subjective functional status/changes:   [] No changes reported  Pt reported that she was having trouble with her velcro garments    OBJECTIVE    15 min Therapeutic Exercise:  [] See flow sheet :self MLD    Rationale: improve fluid management  to improve the patients ability to manage swelling and dressing and dressing    15 min Therapeutic Activity:  []  See flow sheet : education on garments, education on long term management of lymphedema   Rationale: improve fluid management  to improve the patients ability to manage swelling and dressing and dressing     20 min Manual Therapy:  MLD:  knee, lower limb using semi circles, scoop and pump techniques.  Extra time spent on lower limb around ankles where fibrotic tissues lay.     Rationale: decrease pain, increase ROM, increase tissue extensibility and decrease edema  to improve the patients ability to restore PLOF  The manual therapy interventions were performed at a separate and distinct time from the therapeutic activities interventions.          With   [] TE   [] TA   [] neuro   [] other: Patient Education: [x] Review HEP    [] Progressed/Changed HEP based on:   [] positioning   [] body mechanics   [] transfers   [] heat/ice application    [] other:      Pain Level (0-10 scale) post treatment: 0/10    ASSESSMENT/Changes in Function: Patient tolerated treatment session well today. Patient educated on velcro garments and on compression lines of circaid wraps. Pt educated on companies to purchase garments. Pt educated on lymphatic surgeries and longterm management of edema. Patient has progressed well and has been able to perform self MLD well with good carryover for home. Pt has a good home set up with a lymphatic pump and several garments. At this time recommend Pt to be DC to home with HEP. [x]  See Plan of Care  []  See progress note/recertification  [x]  See Discharge Summary         Progress towards goals / Updated goals:  Short Term Goals: To be accomplished in 2 weeks:  1. Patient will be independent and compliant with HEP to progress toward goals and restore functional mobility. Eval Status: issued at eval  Current: Pt independent with HEP at this time 3/31/22     2. Patient will improve FOTO score by 1/2 points to improve functional tolerance for exercise. Eval Status: FOTO to be taken at visit 2  FOTO score = an established functional score where 100 = no disability  Current: discontinued Not taken 3/29/22     3. Pt will have 3/5 John LE hip flexion strength to return to goals of improved lymphatic drainage and management. Eval Status:   Hip Left (1-5) Right (1-5)   Hip Flexion 3 2     Current: 3/31/22 : same as eval         4. Pt will have painfree knee flexion PROM to aid in functional mechanics for ADLs. Eval Status: John LE knee flexion limited.   Current:  Pt showing improvements in knee flexion ROM due to recent botox injections 3/31/22           6.   Pt will decrease Right ankle malleoli leg circumference measurement by 1 centimeters in order to improve donning shoes  Eval Status:   Circumference measurements Left (cm) Right (cm)   Malleoli  33.7 34.5   Current: 3/31/22 33.5 John            7.   Pt and/or caregiver will be able to return demo self MLD in order to facilitate improved fluid management long term.               Eval status: to be given at future visit  Current: pt was able to return demo upper body MLD with mod ind and reported compliance at home. 3/31/22         Long Term Goals: To be accomplished in 4 weeks:  1. Patient will improve FOTO score by full points to improve functional tolerance for walking. Eval Status: FOTO to be taken at visit 2  FOTO score = an established functional score where 100 = no disability  Current: Goal discontinued not taken 3/31/22     3.   Pt will have 3/5 pamela LE strength to return to goals of improved walking and standing tolerance.   Eval Status:   Hip Left (1-5) Right (1-5)   Hip Flexion 3 2   Hip ABD 3 2      Knee Left (1-5) Right (1-5)   Knee Flexion 2 2   Knee Extension 3 3   Ankle DF 3 2    Current: 3/31/22 same as eval            2.   Pt will be able to vimal/doff compression garment with mod ind in order to maintain fluid management.               Eval status: Pt own compression hose at this time.    Current: Pt is able to vimal her current garments with mod ind GOAL MET 3/31/22           5.   Pt will be able to perform self MLD with independence in order to improve long term management of lymphedema.              Current: Pt is able to perform self MLD with ind for upper body and thigh areas.      PLAN  []  Upgrade activities as tolerated     [x]  Continue plan of care  []  Update interventions per flow sheet       [x]  Discharge due to:HEP  []  Other:_      Prashant Urbina, PT 3/31/2022  11:24 AM    Future Appointments   Date Time Provider Adelaida Harper   3/31/2022  2:45 PM [facility] PT [address] [facility]   4/25/2022 11:40 AM [facility] ALEN POD3 NURSE [facility] MARIAH [place]   5/3/2022 11:30 AM [address] [place]   7/26/2022 11:00 AM [facility] [place] [address]

## 2022-03-31 NOTE — PROGRESS NOTES
[address] [facility] PHYSICAL THERAPY  [address] [place] [address] Marjan Hanna, [address] - Phone: [phone]  Fax: [phone]  DISCHARGE SUMMARY FOR PHYSICAL THERAPY          Patient Name: Wilmar Fulton : 1975   Medical   Diagnosis: Right leg weakness [R29.898]  Left leg weakness [R29.898]  Lymphedema [I89.0] Treatment Diagnosis: Right Leg weakness R29.898 Left leg weakness R29.898 Lymphedema I89.0   Onset Date:        Referral Source: Severo Pomfret, MD [facility]): 3/3/2022   Prior Hospitalization: See medical history Provider #: [id number]   Prior Level of Function: Using a rollator mostly, occasional WC for long distances and bad tonal days. Comorbidities: MS, HTN, Gastric bypass, gull stone surgery   Medications: Verified on Patient Summary List     Visits from [facility]: 5 Missed Visits: 1     GOALS:   Short Term Goals: To be accomplished in 2 weeks:  1. Patient will be independent and compliant with HEP to progress toward goals and restore functional mobility. Eval Status: issued at eval  Current: Pt independent with HEP at this time 3/31/22     2. Patient will improve FOTO score by 1/2 points to improve functional tolerance for exercise. Eval Status: FOTO to be taken at visit 2  FOTO score = an established functional score where 100 = no disability  Current: discontinued Not taken 3/29/22     3. Pt will have 3/5 John LE hip flexion strength to return to goals of improved lymphatic drainage and management. Eval Status:   Hip Left (1-5) Right (1-5)   Hip Flexion 3 2               Current: 3/31/22 : same as eval         4. Pt will have painfree knee flexion PROM to aid in functional mechanics for ADLs. Eval Status: John LE knee flexion limited.   Current:  Pt showing improvements in knee flexion ROM due to recent botox injections 3/31/22           6.   Pt will decrease Right ankle malleoli leg circumference measurement by 1 centimeters in order to improve donning shoes  Eval Status:   Circumference measurements Left (cm) Right (cm)   Malleoli  33.7 34.5   Current: 3/31/22 33.5 John            7.   Pt and/or caregiver will be able to return demo self MLD in order to facilitate improved fluid management long term.               Eval status: to be given at future visit  Current: pt was able to return demo upper body MLD with mod ind and reported compliance at home. 3/31/22         Long Term Goals: To be accomplished in 4 weeks:  1. Patient will improve FOTO score by full points to improve functional tolerance for walking. Eval Status: FOTO to be taken at visit 2  FOTO score = an established functional score where 100 = no disability  Current: Goal discontinued not taken 3/31/22     3.   Pt will have 3/5 john LE strength to return to goals of improved walking and standing tolerance. Eval Status:   Hip Left (1-5) Right (1-5)   Hip Flexion 3 2   Hip ABD 3 2      Knee Left (1-5) Right (1-5)   Knee Flexion 2 2   Knee Extension 3 3   Ankle DF 3 2    Current: 3/31/22 same as eval            3.   Pt will be able to vimal/doff compression garment with mod ind in order to maintain fluid management.               Eval status: Pt own compression hose at this time.               Current: Pt is able to vimal her current garments with mod ind GOAL MET 3/31/22           5.   Pt will be able to perform self MLD with independence in order to improve long term management of lymphedema.              Current: Pt is able to perform self MLD with ind for upper body and thigh areas.      Key Functional Changes/Progress: Patient tolerated treatment session well today. Patient educated on velcro garments and on compression lines of circaid wraps. Pt educated on companies to purchase garments. Pt educated on lymphatic surgeries and longterm management of edema. Patient has progressed well and has been able to perform self MLD well with good carryover for home.   Pt has a good home set up with a lymphatic pump and several garments. At this time recommend Pt to be DC to home with HEP. Assessments/Recommendations: Discontinue therapy. Progressing towards or have reached established goals. If you have any questions/comments please contact us directly at [phone]. Thank you for allowing us to assist in the care of your patient. Therapist Signature: Latha Guillaume, PT, DPT, CLT Date: 3/31/2022   Reporting Period: 3/3/22-3/31/22 Time: 11:24 AM      NOTE TO PHYSICIAN:  PLEASE COMPLETE THE ORDERS BELOW AND FAX TO   [facility]: [phone]. If you are unable to process this request in 24 hours please contact our office: [phone].    ___ I have read the above report and request that my patient be discharged from therapy.      Physician Signature:        Date:       Time:

## 2022-09-28 ENCOUNTER — HOME HEALTH ADMISSION (OUTPATIENT)
Dept: HOME HEALTH SERVICES | Facility: HOME HEALTH | Age: 47
End: 2022-09-28
Payer: COMMERCIAL

## 2022-09-30 ENCOUNTER — HOME CARE VISIT (OUTPATIENT)
Dept: SCHEDULING | Facility: HOME HEALTH | Age: 47
End: 2022-09-30
Payer: COMMERCIAL

## 2022-09-30 ENCOUNTER — HOME CARE VISIT (OUTPATIENT)
Dept: HOME HEALTH SERVICES | Facility: HOME HEALTH | Age: 47
End: 2022-09-30
Payer: COMMERCIAL

## 2022-09-30 VITALS
SYSTOLIC BLOOD PRESSURE: 102 MMHG | DIASTOLIC BLOOD PRESSURE: 70 MMHG | TEMPERATURE: 97.5 F | RESPIRATION RATE: 17 BRPM | OXYGEN SATURATION: 99 % | HEART RATE: 88 BPM

## 2022-09-30 PROCEDURE — 400013 HH SOC

## 2022-09-30 PROCEDURE — 400018 HH-NO PAY CLAIM PROCEDURE

## 2022-09-30 PROCEDURE — G0151 HHCP-SERV OF PT,EA 15 MIN: HCPCS

## 2022-09-30 NOTE — Clinical Note
PT summary:  Patient was formally very independent, driving, using a rollator, and is still independent minded, wants to get back to that level of function. She cares for her mother and son. Her MS is giving her some difficulty now as she has soem increased weakness and spasms with activity. She is determined and very motivated. She has a recumbant bike that she can use and will need to bed instructed in how to get on and off and to use it safely. She wants to return to her outpatient PT which was a specialized therapy center for neuro. diagnoses. I did not give her a new HEP as I did not have time, she will continue the ones she knows until you come out.   Thank you

## 2022-10-01 ENCOUNTER — HOME CARE VISIT (OUTPATIENT)
Dept: SCHEDULING | Facility: HOME HEALTH | Age: 47
End: 2022-10-01
Payer: COMMERCIAL

## 2022-10-01 VITALS
OXYGEN SATURATION: 99 % | SYSTOLIC BLOOD PRESSURE: 114 MMHG | TEMPERATURE: 97.6 F | DIASTOLIC BLOOD PRESSURE: 64 MMHG | HEART RATE: 95 BPM | RESPIRATION RATE: 16 BRPM

## 2022-10-01 PROCEDURE — G0299 HHS/HOSPICE OF RN EA 15 MIN: HCPCS

## 2022-10-01 NOTE — Clinical Note
nursing [facility]anda only. She is very knowledgeable with her MS. She prefers to cont to go to urology or urgent care for UTI s/sx as she needs treatment ASAP to prevent MS relapse. She know her medications. I added the ocrelizumab infusion she gets every 6 months. Her spouse was picking up her miralax and suppository. She has used these in the past as well.  Thank you

## 2022-10-01 NOTE — HOME HEALTH
HPI:   Patient reports she was driving and noted she could not feel her legs and her R hand. She went to the hospital and was diagnosed with a severe UTI and sepsis. She was hospitalized then transferred to a rehab facility before d/c home. Cole Lorenzo PMHx:    Hypoglycemia    Vitamin D deficiency    Cervical spondylosis    Incontinence    OAB (overactive bladder)    Neurogenic bladder    Urge incontinence    Multiple sclerosis  8/23/2019    . SUBJECTIVE:    Patient is an independent minded 41 y.o, female with MS, who reports she is an active mother and her goal is to return to her prior level of function. She is ready and willing to participate in the program, and her goal is to be able to go back to [facility] where she had been going prior to her hospitalization. LIVING SITUATION:   Mrs Joana Villalpando lives with her , mother and children in a single family home, ramp to enter, and her living and sleeping areas are all 1 level. CAREGIVER INVOLVEMENT/ASSISTANCE NEEDED FOR:   is able to assist the patient as needed, her son was observed retrieving things for her and generally assisting her. She cares for her mother who stays with the family and needs guidance and care. PLOF:  Prior to illness she was driving, caring for her family and bringing her mother to appointments, independent with ADLs. MEDICATIONS RECONCILED AND UPDATED AS FOLLOWS: All meds entered. High risk medication teaching regarding anticoagulants, hyperglycemic agents or opioid narcotics performed for: n/a. Instructed patient on follow up with PCP re: changes or questions. The following medication discrepancies/interactions were noted: none. NEXT MD APPT:  TBD. Patient/caregiver encouraged/instructed to keep appointment as lack of follow through with physician appointment could result in discontinuation of home care services for non-compliance. .  ROM:  BLEs WFL when relaxed.   When her tone and spasms occur, she has a limited ability to complete full AROM. R foot drop during ambulation. STRENGTH:   BLE weakness 2- to 3-/5, see PTT ROM for details  WOUNDS:  n/a. BED MOBILITY: Supervision to min A to support legs in and out. TRANSFERS: Min A to provide support due to weakness and LE muscle spasms. GAIT: FWW. Gait characterized by flexed posture, standing behind not in walker,  R toe drag, poor B foot clearance, short step lengths, decreased B heel strike, slow teto and speed with intermittant stopping. Cues/instruction needed for upright posture and stand closer to walker. fg [employer] STAIRS:  n/a. There is a threshold ramp to enter home. BALANCE:  Pt scored 5/28 on Tinetti Balance Assessment placing patient at high risk for falls. Delma Perdomo PATIENT RESPONSE TO TREATMENT: Patient noted that her quads were beginning to spasm when she attempted to stand for the Tinetti balance test.  She tolerated the assessmewnt without c/o fatigue or increased pain. Delma Perdomo PATIENT EDUCATION PROVIDED THIS VISIT:  Home safety to include Use FWW until able to control the 4ww, ie, until stronger and balance is better, HEP, HIPAA, HHBOR. Delma Perdomo PATIENT RESPONSE TO EDUCATION PROVIDED: Patient aniyah=gaged in discussion and verbalized understanding. Delma Perdomo HEP consisting of:  1. Walking with FWW   2. Continue HEP you received [facility] rehab. Patient will need reinforcement and continued education for progression and modification of HEP as needed throughout the episode of care. .  ASSESSMENT AND CONTINUED NEED FOR THE FOLLOWING SKILLS:   Patient presents post hospitalization with deficits, as described, in strength, gait and balance that impairs her ability to transfer, navigate her home and that increases her risk for falling and her dependence. HHPT is indicated in order to provide skilled interventions to improve and restore balance, strength, transfers and gait, that will improve her overall safety and lower her risk for falls and injury.   Skilled interventions will include: Instruction in ther-ex, NMR, HEP for strength, balance improvement; Training in gait, transfers, balance; Education in fall prevention, safety, energy conservation. PT will monitor vital signs, pain and patient response to therapy. .   POC:  Patient/caregiver instructed on POC and are agreeable to POC at this time. POC and admission to home health status called to Dr. Joanna Epps on 10/3/22. PLAN:  HHPT:  1w1, 2w4, 1w1    DISCHARGE PLANNING DISCUSSED: Discharge to self and family under MD supervision once all goals have been met or patient has reached max potential. Patient/caregiver verbalized understanding.

## 2022-10-01 NOTE — Clinical Note
Thank you  ----- Message -----  From: Julia Padgett RN  Sent: 10/1/2022   5:29 PM EDT  To: Flip Servin, PT      nursing [facility] only. She is very knowledgeable with her MS. She prefers to cont to go to urology or urgent care for UTI s/sx as she needs treatment ASAP to prevent MS relapse. She know her medications. I added the ocrelizumab infusion she gets every 6 months. Her spouse was picking up her miralax and suppository. She has used these in the past as well.  Thank you

## 2022-10-01 NOTE — HOME HEALTH
Skilled reason for visit: nursing eval: MS leuckocytosis, and neurogenic bladder    Caregiver involvement: spouse and children assist/needs training with ADLS/IADLS/MEAL PREP. THERAPY WILL CONT TO TRAIN WITH ADLS/IADLS. Medications reviewed and all medications are available in the home this visit. The following education was provided regarding medications:  reviewed all medications in the home purpose/side effects and frequency. very knowlegable of medications and what to report to MD.  MD notified of any discrepancies/look a-like medications/medication interactions:   Medications are effective at this time. Home health supplies by type and quantity ordered/delivered this visit include: N/A    Patient education provided this visit: Reviewed fall precautions and safety:dangle, uses assistive device at all times, non skid foot wear,and night light. Verbalized understanding. Sharps education provided: instructed on MS: stress management can decrease relapses; do activities that promote relaxation; exercise may help reduce fatigue/depression; certain illnesses can exacerbate MS symptoms - ensure the patient gets all physician-recommended vaccinations. Adjunct management/support may be found in physical therapy, occupational therapy, counseling, or via support groups/national support organizations. Instruct patient/caregiver to notify physician or home health for concerns that condition is worsening or not being managed using current treatments/medications. Reviewed fall precautions and safety:dangle, uses assistive device at all times, non skid foot wear,and night light. Verbalized understanding. Reviewed UTI S/SX: BURNING, FOUL ODOR, FEVER, CHILLS, INCREASED HEART/RESPIRATORY, BACK PAIN,ABDOMINAL PAIN,TENDERNESS AROUND FAWAD AREA, OR BLOOD/DARK URINE REPORT TO MD IMMEDIATELY/SEEK MEDICAL TREATMENT. advised to call medical provider for non-life-threathing concerns before going to ER/urgent care. Reviewed to increase fiber/fruits/vegetables in diet, hydration,ambulation and OTC stool softner to improve constipation. verbalized understanding. verbalized understanding and repeat back      Patient level of understanding of education provided: verbalized understanding and repeat back.

## 2022-10-03 ENCOUNTER — HOME CARE VISIT (OUTPATIENT)
Dept: HOME HEALTH SERVICES | Facility: HOME HEALTH | Age: 47
End: 2022-10-03
Payer: COMMERCIAL

## 2022-10-04 ENCOUNTER — HOME CARE VISIT (OUTPATIENT)
Dept: SCHEDULING | Facility: HOME HEALTH | Age: 47
End: 2022-10-04
Payer: COMMERCIAL

## 2022-10-04 VITALS
SYSTOLIC BLOOD PRESSURE: 147 MMHG | DIASTOLIC BLOOD PRESSURE: 83 MMHG | HEART RATE: 85 BPM | RESPIRATION RATE: 16 BRPM | TEMPERATURE: 98.1 F

## 2022-10-04 PROCEDURE — G0157 HHC PT ASSISTANT EA 15: HCPCS

## 2022-10-04 PROCEDURE — G0152 HHCP-SERV OF OT,EA 15 MIN: HCPCS

## 2022-10-04 PROCEDURE — G0155 HHCP-SVS OF CSW,EA 15 MIN: HCPCS

## 2022-10-04 NOTE — HOME HEALTH
SUBJECTIVE: Patient reported feeling constant pain #6/10 this morning due to rainy weather. CAREGIVER INVOLVEMENT/ASSISTANCE NEEDED FOR: Patient's  assists with transportation, ADLS, and IADLS. HOME HEALTH SUPPLIES BY TYPE AND QUANTITY ORDERED/DELIVERED THIS VISIT INCLUDE: None needed for this visit. OBJECTIVE:  See interventions. PATIENT RESPONSE TO TREATMENT: Patient did not report any increase in pain after walking. PATIENT LEVEL OF UNDERSTANDING OF EDUCATION PROVIDED: Patient verbalized understanding on pain management techniques and fall prevention. ASSESSMENT OF PROGRESS TOWARD GOALS: Patient is progressing toward goals for gait and transfers. Pt needed close SBA for gait training. Patient locks B knee into hyperextension and ambulated with decreased teto, decreased foot clearance, and decreased step length. Gave multiple vc's for pt to lift B foot higher to clear floor safely and to take longer steps. Pt needs reinforcement for safety with gait and to improve gait mechanics. Pt also previously needed Min A to perform sit < > stand transfers but progressed to [id number] for transfer training today. Gave vc's for pt to lean forward and to push off using B UE to stand up. Pt needs reinforcement to improve transfer technique. In addition, instructed patient on performing seated therapeutic exercises and to comply with HEP. CONTINUED NEED FOR THE FOLLOWING SKILLS: Gait Training, Stairs Training, Transfer Training, [employer], Balance Training, and Therapeutic Exercises. PLAN FOR NEXT VISIT: Patient will be seen 1w1 2w3 to increase safety with gait, to improve transfer technique, and to increase strength of B LE. DISCHARGE PLANNING DISCUSSED WITH PATIENT/CAREGIVER: Discharge patient to family with MD supervision when all goals are met. Pt/Caregiver did verbalize understanding of discharge planning.

## 2022-10-04 NOTE — Clinical Note
Thank you  ----- Message -----  From: Altagracia Padilla OT  Sent: 10/5/2022  11:05 AM EDT  To: Blair Butler, PT      Therapy Functional Score Assessment  Question   Score   Grooming  2       Upper Dressing 2      Lower Dressing 2      Bathing  5      Toilet Transfer  1    Transfer  2            Ambulation  3   Dyspnea                     0       Pain Interfering with activity 0  Est number therapy visits      4

## 2022-10-04 NOTE — Clinical Note
Therapy Functional Score Assessment  Question   Score   Grooming  2       Upper Dressing 2      Lower Dressing 2      Bathing  5      Toilet Transfer  1    Transfer  2            Ambulation  3   Dyspnea                     0       Pain Interfering with activity 0  Est number therapy visits      4

## 2022-10-05 NOTE — HOME HEALTH
Caregiver involvement: Neville Blanca (spouse) lives with pt and provides emotional support. Medications reviewed and all medications are available in the home this visit. The following education was provided regarding medications, medication interactions, and look alike medications (specify): Continue as directed by MD.    Medications  are effective at this time. Patient education provided this visit: see ADL note    Sharps education provided:  na    Patient level of understanding of education provided: see ADL note    Skilled Care Performed this visit: Completed OT evaluation and assessment for safety with ADL and mobility. Patient response to procedure performed:  Ms. Alexandrea Borges had a positive response to therapy. She denies having any pain     Patient's Progress towards personal goals: Ms. Alexandrea Borges has excellent rehab potential with ongoing therapy to improve safety and promote independence with ADL and mobility. She currently requires CGA/min A for safety with ADL and mobility in the home. Home exercise program: B UE strengthening against gravity for shoulder flexion/exension, overhead press, chest press, ab/adduction and elbow flexion/extension    Continued need for the following skills: Physical Therapy, Occupational Therapy and Medical Social Work    Discharge Plans:  1w1, 2w1, 1w1 with plans to discharge to self once maximal potential has been achieved with self care and functional mobility in the home setting. CONTINUED NEED FOR THE FOLLOWING SKILLS: HH OT is medically necessary to address barriers of decreased balance, generalized weakness, decreased activity tolerance and spasticity R>L side. These barriers limit pt's participation in ADL and functional mobility safely and would benefit from continued skilled OT to maximize independende and reduce burden on caregiver.     List of Comorbidities:  Past Medical History:  Multiple sclerosis-- q 6 months tx  neurogenic bladder  right drop foot htn  rad/ asthma?  b/ le lymphedema    FTF verified pending fields 22/26, 10/4/22 TS              Inpatient Notes         History Of Present Illness: This is a 26-year-old female with history of MS and neurogenic bladder, was sent to the emergency department secondary to low back pain. Patient states that the pain was steadily increasing and routine lab work showed evidence of leukocytosis. In the event of recent sepsis secondary to pyelonephritis, patient was sent back to the emergency department for further workup. Patient was found to have fec al impaction resulting in the low back pain. Sepsis was ruled out. Patient was initiated on routine bowel regimen with good improvement. After stabilization of her ongoing medical condition, she was discharged back to skilled nursing facility for further rehab needs.

## 2022-10-05 NOTE — CASE COMMUNICATION
Occupational Therapy Evaluation    1w1, 2w1, 1w1    Ms. Austin Tony is ambulating with her rollator in the home. She ambulates with slow teot due to increased spasticity of the R LE. She reports that she is due for Botox injections end of the month. She has a walk in shower with handheld shower attachment and grab bars but states that she does not feel safe due to her shower seat not having hand bars. Suggested a tub transfer bench to  bridge gap in shower and reduce need to  step in for ease with transfer. Advised pt that the tub bench only has one bar; however, discussed strategies to use grab bar on 3:1 adjacent to bench to help with sit <> stand. Pt agreeable to purchase. She is currently sponge bathing and needs min A for thoroughness. She requires assistance to safely retreive her clothing due to cluttered walk ways and impaired balance. Suggested keeping w alkways clear. Educated pt on use of energy conservation strategies such as sitting during functional tasks, taking frequent rest breaks and keeping frequently used items within reach. Pt was receptive to suggestions. She has a hip kit but states that she has not tried to use the sock aide yet. She reports that most days, she does not wear socks. CGA needed for safety with balance to adjust clothing. Ms. Austin Tony is able to perform s it <> stand from her 3:1 over the toilet with SBA but requires CGA for safety to enter bathroom due to rollator not fitting. Ms. Austin Tony is agreeable for ongoing skilled OT to maximize her safety with ADL and mobility in the home. She is eager to learn energy conservation to improve safety with IADL tasks so that she can continued to provide care to her mother with alzheimer's and her children.

## 2022-10-06 ENCOUNTER — HOME CARE VISIT (OUTPATIENT)
Dept: SCHEDULING | Facility: HOME HEALTH | Age: 47
End: 2022-10-06
Payer: COMMERCIAL

## 2022-10-06 VITALS
SYSTOLIC BLOOD PRESSURE: 123 MMHG | HEART RATE: 79 BPM | DIASTOLIC BLOOD PRESSURE: 76 MMHG | TEMPERATURE: 98 F | RESPIRATION RATE: 16 BRPM

## 2022-10-06 PROCEDURE — G0157 HHC PT ASSISTANT EA 15: HCPCS

## 2022-10-07 NOTE — HOME HEALTH
MSW met with the pt and her spouse Alwin Hatchet. Pt is knowledgeable of community resources, desires to be as independent as possible, and advocates for her healthcare. MSW provided the pt documentation of social service resources, 211 (resource hotline), support programs, and to apply for Medicaid and utility assistance. MSW also provided information about care assistance options/costs/funding sources and [facility] [address] [place] [address] that may be beneficial for the pt's mother, whom the pt and spouse provide support for. MSW invited a return call if resource questions arise.

## 2022-10-07 NOTE — HOME HEALTH
SUBJECTIVE: Patient reported feeling constant pain #8/10 this morning due. CAREGIVER INVOLVEMENT/ASSISTANCE NEEDED FOR: Patient's  assists with transportation, ADLS, and IADLS. HOME HEALTH SUPPLIES BY TYPE AND QUANTITY ORDERED/DELIVERED THIS VISIT INCLUDE: None needed for this visit. OBJECTIVE:  See interventions. PATIENT RESPONSE TO TREATMENT: Patient reported decreased pain after walking and performing transfers. PATIENT LEVEL OF UNDERSTANDING OF EDUCATION PROVIDED: Patient repeated back teaching on pain management techniques and fall prevention. ASSESSMENT OF PROGRESS TOWARD GOALS: Patient is progressing toward goals for gait and transfers. Pt previously needed close SBA to ambulate safely but progressed to SBA for gait training today using FWW. Patient locks B knee into hyperextension. Reinforced lifting B foot higher to clear floor safely and taking longer steps when ambulating. Pt also previously needed CGA to perform sit < > stand transfers but progressed to SBA for transfer training today. Gave vc's for pt to lean forward and to push off using B UE to stand up. Pt needs extra time and reinforcement to improve transfer technique. In addition, pt performed seated therapeutic exercises and was instructed to comply with HEP. CONTINUED NEED FOR THE FOLLOWING SKILLS: Gait Training, Stairs Training, Transfer Training, [employer], Balance Training, and Therapeutic Exercises. PLAN FOR NEXT VISIT: Patient will be seen 2w3 to increase safety with gait, to improve transfer technique, and to increase strength of B LE. DISCHARGE PLANNING DISCUSSED WITH PATIENT/CAREGIVER: Discharge patient to family with MD supervision when all goals are met. Pt/Caregiver did verbalize understanding of discharge planning.

## 2022-10-11 ENCOUNTER — HOME CARE VISIT (OUTPATIENT)
Dept: SCHEDULING | Facility: HOME HEALTH | Age: 47
End: 2022-10-11
Payer: COMMERCIAL

## 2022-10-11 VITALS
HEART RATE: 82 BPM | TEMPERATURE: 98 F | RESPIRATION RATE: 18 BRPM | DIASTOLIC BLOOD PRESSURE: 90 MMHG | SYSTOLIC BLOOD PRESSURE: 132 MMHG

## 2022-10-11 PROCEDURE — G0157 HHC PT ASSISTANT EA 15: HCPCS

## 2022-10-11 NOTE — HOME HEALTH
SUBJECTIVE: Patient reported feeling constant pain #8/10 from her B LE this visit. CAREGIVER INVOLVEMENT/ASSISTANCE NEEDED FOR: Patient's  assists with transportation, ADLS, and IADLS. HOME HEALTH SUPPLIES BY TYPE AND QUANTITY ORDERED/DELIVERED THIS VISIT INCLUDE: None needed for this visit. OBJECTIVE:  See interventions. PATIENT RESPONSE TO TREATMENT: Patient reported feeling increased pain from her B LE after walking outside and performing car transfer. PATIENT LEVEL OF UNDERSTANDING OF EDUCATION PROVIDED: Goal met. ASSESSMENT OF PROGRESS TOWARD GOALS: Patient is progressing toward goals for gait and transfers. Pt continues to need close SBA to ambulate safely but progressed to walking outside over uneven surfaces for gait training today using Rollator walker. Patient ambulated with locked B knee, decreased teto, decreased foot clearance, and forward flexed trunk. Gave multiple vc's for pt to lift B foot higher to clear floor safely and to maintain erect posture. Pt went up/down short ramp using Rollator walker close SBA. Pt needs reinforcement for safety with gait and to improve posture. Pt also continues to need SBA to perform transfers and progressed to performing car transfers today. Pt needed extra time but demonstrated good technique to perform car transfers. In addition, reinforced compliance with HEP. CONTINUED NEED FOR THE FOLLOWING SKILLS: Gait Training, Stairs Training, Transfer Training, [employer], Balance Training, and Therapeutic Exercises. PLAN FOR NEXT VISIT: Patient will be seen 2w2 to reassess safety with gait, to improve transfer technique, and to increase strength of B LE. DISCHARGE PLANNING DISCUSSED WITH PATIENT/CAREGIVER: Discharge patient to family with MD supervision when all goals are met. Pt/Caregiver did verbalize understanding of discharge planning.

## 2022-10-12 ENCOUNTER — HOME CARE VISIT (OUTPATIENT)
Dept: SCHEDULING | Facility: HOME HEALTH | Age: 47
End: 2022-10-12
Payer: COMMERCIAL

## 2022-10-12 VITALS
RESPIRATION RATE: 16 BRPM | DIASTOLIC BLOOD PRESSURE: 76 MMHG | SYSTOLIC BLOOD PRESSURE: 130 MMHG | TEMPERATURE: 98 F | HEART RATE: 76 BPM

## 2022-10-12 PROCEDURE — G0158 HHC OT ASSISTANT EA 15: HCPCS

## 2022-10-13 ENCOUNTER — HOME CARE VISIT (OUTPATIENT)
Dept: HOME HEALTH SERVICES | Facility: HOME HEALTH | Age: 47
End: 2022-10-13
Payer: COMMERCIAL

## 2022-10-13 ENCOUNTER — HOME CARE VISIT (OUTPATIENT)
Dept: SCHEDULING | Facility: HOME HEALTH | Age: 47
End: 2022-10-13
Payer: COMMERCIAL

## 2022-10-13 PROCEDURE — G0151 HHCP-SERV OF PT,EA 15 MIN: HCPCS

## 2022-10-13 NOTE — HOME HEALTH
SUBJECTIVE: Pt seated at kitchen table upon arrival, pt reported she was feeling good, was having R hand/arm weakness she wanted to target, she also plans on ordering new rollator and tub transfer bench soon. CAREGIVER ASSISTANCE NEEDED FOR: pts mother who she cares for, lives with her, as well as  and two kids who came home towards end of visit. MEDICATIONS REVIEWED AND UPDATED: no medication changes reported this visit  . OBJECTIVE: see interventions  PATIENT RESPONSE TO TREATMENT:  Pt demonstrated positive response to treatment with no increased pain and good participation with slowed gait throughout home for safety. Kristina Grimes PATIENT/CAREGIVER EDUCATION PROVIDED THIS VISIT: Therapist educated pt on importance of daily HEP, rest breaks, deliberate R foot placement and internal rotation and recommended EOB grab bar to help with positioning and safety. PATIENT LEVEL OF UNDERSTANDING OF EDUCATION PROVIDED: Pt verbalized good understanding and demonstrated teachback with HEP, had difficult time with carry over of R foot palcement and was educated on importance of utilizing AFO. ASSESSMENT AND PROGRESS TOWARD GOALS:  Pt demonstrated good progress towards goals for mobility, self-care and strengthening with decreased assistance and proper use of AD. Patient will benefit from continued intervention with progression of IADL, safety, balance and endurance training. CONTINUED NEED FOR THE FOLLOWING SKILLS: HH OT is medically necessary to address pain, decreased ROM, decreased strength, impaired bed mobility, decreased independence with functional transfers, decreased I with I/ADLs, and impaired balance in order to improve functional independence, quality of life, return to PLOF, reduce the risk for falls, and reduce pain. PLAN: next visit will be for IADL training with use of energy conservation techniques and proper AD.     DISCHARGE PLANNING DISCUSSED: Discharge to self and family under MD supervision once all goals have been met or patient has reached maximum potential.  Frequency remaininw2 remaining.

## 2022-10-13 NOTE — HOME HEALTH
SUBJECTIVE:   Patient reports she continues to improve but is not at her PLOF. She ordered a bedrail assist bar. CAREGIVER INVOLVEMENT/ASSISTANCE NEEDED FOR:  ADLs, transportation, safety, shopping  [address] ORDERED/DELIVERED THIS VISIT INCLUDE: n/a  OBJECTIVE:  See interventions. PATIENT RESPONSE TO TREATMENT:  Patient became fatigued with activity, she had been assisting her mom this marques and was fatigued from that as well. It is noted that as she porgressed and became kmore fatigued, her R toe drag during gait increased. She persisted through the session and was able to rest as needed. PATIENT LEVEL OF UNDERSTANDING OF EDUCATION PROVIDED: Patient verbalized and demonstrated understanding of ed re: safety, gait, progress, plan. ASSESSMENT OF PROGRESS TOWARD GOALS:   Patient has improved in balance and gait, with Tinetti 13 up from 5 at [facility], and approaching goal of 19. She is improving in gait as she is able to exit home and walk to her car. Bed mobility will be safer and more independent once she has the adaptations she needs. She is on target for anticipated d/c.   CONTINUED NEED FOR THE FOLLOWING SKILLS: HHPT is needed to continue strengthening BLEs to improve gait safety and balance, transfer safety and independence. PLAN FOR NEXT VISIT: Standing ther-ex for strengthening. Assess recumbant bike for appropriateness to use as HEP. THE FOLLOWING DISCHARGE PLANNING WAS DISCUSSED WITH THE PATIENT/CAREGIVER: Continue HH PT 2w2, 1w1, d/c when goals met or max potential achieved.

## 2022-10-14 ENCOUNTER — HOME CARE VISIT (OUTPATIENT)
Dept: SCHEDULING | Facility: HOME HEALTH | Age: 47
End: 2022-10-14
Payer: COMMERCIAL

## 2022-10-14 VITALS
HEART RATE: 76 BPM | RESPIRATION RATE: 18 BRPM | DIASTOLIC BLOOD PRESSURE: 70 MMHG | TEMPERATURE: 98.2 F | SYSTOLIC BLOOD PRESSURE: 134 MMHG

## 2022-10-14 PROCEDURE — G0158 HHC OT ASSISTANT EA 15: HCPCS

## 2022-10-14 NOTE — HOME HEALTH
SUBJECTIVE: Pt coming out from bedroom with rollator I upon arrival, reported she had been feeling ok just stiff today. Pt tub bench had not arrived yet but should be here by DC visit on monday for training with, pt has also ordered bed rail as recommended and new rollator with safer brakes. CAREGIVER ASSISTANCE NEEDED FOR: pts  home during visit for assistance as needed  [address]: no medication changes reported this visit. .  OBJECTIVE: see interventions  PATIENT RESPONSE TO TREATMENT:  Pt demonstrated positive response to treatment with no increased pain and good participation. Naomy Juarez PATIENT/CAREGIVER EDUCATION PROVIDED THIS VISIT: Therapist educated pt on safer technique to place body and walker closer to drawer/cabinet she needs rather than reaching/leaning too far outside of KENDAL to prevent falls. PATIENT LEVEL OF UNDERSTANDING OF EDUCATION PROVIDED: Pt verbalized understanding and stated she gets lazy but knows better and needs to move closer for safety. ASSESSMENT AND PROGRESS TOWARD GOALS:  Pt demonstrated good progress towards IADL, balance and UE strengthening goals with increased I and safety using proper AD. PLAN: next visit will be for final AD/DME training and review of all recommended exercises followed by OTDC. DISCHARGE PLANNING DISCUSSED: Discharge to self and family under MD supervision once all goals have been met or patient has reached maximum potential.  Frequency remaininw1 remaining with pt aware of OTDC next visit.

## 2022-10-17 ENCOUNTER — HOME CARE VISIT (OUTPATIENT)
Dept: SCHEDULING | Facility: HOME HEALTH | Age: 47
End: 2022-10-17
Payer: COMMERCIAL

## 2022-10-17 VITALS
SYSTOLIC BLOOD PRESSURE: 140 MMHG | RESPIRATION RATE: 16 BRPM | HEART RATE: 76 BPM | DIASTOLIC BLOOD PRESSURE: 82 MMHG | TEMPERATURE: 98.2 F

## 2022-10-17 PROCEDURE — G0152 HHCP-SERV OF OT,EA 15 MIN: HCPCS

## 2022-10-17 NOTE — Clinical Note
Thanks Mary Galvez  ----- Message -----  From: Gee Sagastume  Sent: 10/17/2022   8:32 PM EDT  To: Diana Wright, OT, Ciro Smith, PT, *      Mrs. Desirae Moore was seen by [facility] from 10/4 - 10/17 to address safety and I with I/ADL tasks, transfer, balance and strength using proper AD and technique. Pt has diagnosis of MS and receives quarterly botox injections to help with spasticity, pt currently a few weeks behind and is on schedule to receive soon but dealt with lots of spasticity during treatments hindering her LE mobility for transfers and ambulation. Pt has met all goals except tub/shower transfer using tub bench due to increased spasticity not allowing for much flexion at B hips and knees requiring mechanical assistance to lift. Pt S level for all bathing, dressing, toileting and grooming using LAHD as needed and given extra time, pt S level for chair, couch and commode transfers from rollator with mod A to perform tub bench to shower transfer with assistance to lift/manipulate BLE due to spasticity. Pt S level for light meal prep task with good use of rollator for item transport and energy conservation. Pt able to tolerate standing task fro 15+ min with 6/10 fatigue reported when finally sitting to rest, pt I with UB HEP and theraputty exercises as educated with good carry over and improved BUE strength to grossly 4/5. Pt balance improved to fair+ for static and dynamic movements with improved posture but continues not to wear AFOs that would help with R ankle stability and control. Pt aware of plans to trasition back to OP therapy when fully discharged. MD and care team made aware of OTDC this date.

## 2022-10-18 ENCOUNTER — HOME CARE VISIT (OUTPATIENT)
Dept: SCHEDULING | Facility: HOME HEALTH | Age: 47
End: 2022-10-18
Payer: COMMERCIAL

## 2022-10-18 VITALS
SYSTOLIC BLOOD PRESSURE: 143 MMHG | DIASTOLIC BLOOD PRESSURE: 90 MMHG | RESPIRATION RATE: 16 BRPM | HEART RATE: 89 BPM | TEMPERATURE: 98.4 F

## 2022-10-18 PROCEDURE — G0157 HHC PT ASSISTANT EA 15: HCPCS

## 2022-10-18 NOTE — CASE COMMUNICATION
Mrs. Desirae Moore was seen by [facility] from 10/4 - 10/17 to address safety and I with I/ADL tasks, transfer, balance and strength using proper AD and technique. Pt has diagnosis of MS and receives quarterly botox injections to help with spasticity, pt currently a few weeks behind and is on schedule to receive soon but dealt with lots of spasticity during treatments hindering her LE mobility for transfers and ambulation. Pt has met all goals except  tub/shower transfer using tub bench due to increased spasticity not allowing for much flexion at B hips and knees requiring mechanical assistance to lift. Pt S level for all bathing, dressing, toileting and grooming using LAHD as needed and given extra time, pt S level for chair, couch and commode transfers from rollator with mod A to perform tub bench to shower transfer with assistance to lift/manipulate BLE due to spasticity. Pt S l evel for light meal prep task with good use of rollator for item transport and energy conservation. Pt able to tolerate standing task fro 15+ min with 6/10 fatigue reported when finally sitting to rest, pt I with UB HEP and theraputty exercises as educated with good carry over and improved BUE strength to grossly 4/5. Pt balance improved to fair+ for static and dynamic movements with improved posture but continues not to wear AFOs that  would help with R ankle stability and control. Pt aware of plans to trasition back to OP therapy when fully discharged. MD and care team made aware of OTDC this date.

## 2022-10-18 NOTE — HOME HEALTH
Pt to be discharged from [facility] this visit following final DME training for shower transfers with tub benc. Pt has met all goals and is aware of OTDC. See DC summary for details.

## 2022-10-18 NOTE — HOME HEALTH
SUBJECTIVE: Patient reported feeling increased pain #9/10 from her B LE this morning possibly due to rainy weather. CAREGIVER INVOLVEMENT/ASSISTANCE NEEDED FOR: Patient's  assists with transportation, ADLS, and IADLS. HOME HEALTH SUPPLIES BY TYPE AND QUANTITY ORDERED/DELIVERED THIS VISIT INCLUDE: None needed for this visit. OBJECTIVE:  See interventions. PATIENT RESPONSE TO TREATMENT: Patient reported about the same pain after performing transfers. PATIENT LEVEL OF UNDERSTANDING OF EDUCATION PROVIDED: Goal met. ASSESSMENT OF PROGRESS TOWARD GOALS: Patient is progressing toward goals for transfers and strengthening. Pt was unable to ambulate today secondary to c/o elevated B LE pain #9/10 this morning. Pt also previously needed SBA to perform sit < > stand transfers but progressed to Supervision for transfer training today. Gave vc's for pt to lean forward and to push off using B UE to stand up. Pt demonstrated improved transfer technique after giving verbal cues. In addition, pt also performed seated therapeutic exercises x 10 repetitions for strengthening. She demonstrated good knowledge of HEP. CONTINUED NEED FOR THE FOLLOWING SKILLS: Gait Training, Stairs Training, Transfer Training, [employer], Balance Training, and Therapeutic Exercises. PLAN FOR NEXT VISIT: Patient will be seen 1w1 2w1 1w1 to reassess safety with gait, to improve transfer technique, and to increase strength of B LE. DISCHARGE PLANNING DISCUSSED WITH PATIENT/CAREGIVER: Discharg e patient to family with MD supervision when all goals are met. Pt/Caregiver did verbalize understanding of discharge planning.

## 2022-10-20 ENCOUNTER — HOME CARE VISIT (OUTPATIENT)
Dept: SCHEDULING | Facility: HOME HEALTH | Age: 47
End: 2022-10-20
Payer: COMMERCIAL

## 2022-10-20 PROCEDURE — G0157 HHC PT ASSISTANT EA 15: HCPCS

## 2022-10-21 VITALS — RESPIRATION RATE: 16 BRPM | TEMPERATURE: 98 F | DIASTOLIC BLOOD PRESSURE: 90 MMHG | SYSTOLIC BLOOD PRESSURE: 130 MMHG

## 2022-10-21 NOTE — HOME HEALTH
SUBJECTIVE: Patient reported feeling pain #6/10 from her B LE this morning. CAREGIVER INVOLVEMENT/ASSISTANCE NEEDED FOR: Patient's  assists with transportation, ADLS, and IADLS. HOME HEALTH SUPPLIES BY TYPE AND QUANTITY ORDERED/DELIVERED THIS VISIT INCLUDE: None needed for this visit. OBJECTIVE:  See interventions. PATIENT RESPONSE TO TREATMENT: Patient reported increased B LE pain #7-8/10 after walking and performing transfers. PATIENT LEVEL OF UNDERSTANDING OF EDUCATION PROVIDED: Goal met. ASSESSMENT OF PROGRESS TOWARD GOALS: Patient is progressing toward goals for gait and transfers. Pt previously was unable to ambulate secondary to c/o elevated B LE pain #9/10 but progressed to close S for gait training today. Patient ambulated with decreased teto, decreased foot clearance, and forward flexed trunk. Gave multiple vc's for pt to lift B foot higher to clear floor safely and to maintain erect posture. Pt demonstrated improved posture after giving verbal cues but needs reinforcement for safety with gait and to increase foot clearance. Pt also previously needed Supervision to perform sit < > stand transfers but needed close S for transfer training today. Gave vc's for pt to lean forward and to push off using B UE to stand up. Pt demonstrated improved transfer technique after giving verbal cues. In addition, reinforced compliance with HEP. CONTINUED NEED FOR THE FOLLOWING SKILLS: Gait Training, Stairs Training, Transfer Training, [employer], Balance Training, and Therapeutic Exercises. PLAN FOR NEXT VISIT: Patient will be seen 2w1 1w1 to reassess safety with gait, to improve transfer technique, and to increase strength of B LE. DISCHARGE PLANNING DISCUSSED WITH PATIENT/CAREGIVER: Discharg e patient to family with MD supervision when all goals are met. Pt/Caregiver did verbalize understanding of discharge planning.

## 2022-10-25 ENCOUNTER — HOME CARE VISIT (OUTPATIENT)
Dept: SCHEDULING | Facility: HOME HEALTH | Age: 47
End: 2022-10-25
Payer: COMMERCIAL

## 2022-10-25 VITALS
HEART RATE: 87 BPM | SYSTOLIC BLOOD PRESSURE: 133 MMHG | DIASTOLIC BLOOD PRESSURE: 79 MMHG | RESPIRATION RATE: 16 BRPM | TEMPERATURE: 98.1 F

## 2022-10-25 PROCEDURE — G0157 HHC PT ASSISTANT EA 15: HCPCS

## 2022-10-25 NOTE — HOME HEALTH
SUBJECTIVE: Patient reported feeling pain #5/10 from her B LE this morning. CAREGIVER INVOLVEMENT/ASSISTANCE NEEDED FOR: Patient's  assists with transportation, ADLS, and IADLS. HOME HEALTH SUPPLIES BY TYPE AND QUANTITY ORDERED/DELIVERED THIS VISIT INCLUDE: None needed for this visit. OBJECTIVE:  See interventions. PATIENT RESPONSE TO TREATMENT: Patient reported feeling about the same pain #5/10 from her B LE after walking and performing transfers. PATIENT LEVEL OF UNDERSTANDING OF EDUCATION PROVIDED: Goal met. ASSESSMENT OF PROGRESS TOWARD GOALS: Patient is progressing toward goals for gait and transfers. Pt previously needed close S to ambulate safely but progressed to S for gait training today. Patient ambulated with decreased teto, shuffling gait, decreased foot clearance, and forward flexed trunk. Gave multiple vc's for pt to lift B foot higher to clear floor safely and to maintain erect posture. Pt has difficulty lifting B foot higher and maintaining upright posture. Pt also previously needed close S to perform sit < > stand transfers but progressed to S for transfer training today. Gave vc's for pt to lean forward, to put equal weight-bearing on B LE, and to push off using B UE to stand up. Pt needs reinforcement to improve transfer technique. In addition, reinforced compliance with HEP and walking around using Rollator walker. Discussed with patient/CG plan to discharge pt from [facility] PT services next week. Pt/CG verbalized understanding and is in agreement with discharge plan. CONTINUED NEED FOR THE FOLLOWING SKILLS: Gait Training, Stairs Training, Transfer Training, [employer], Balance Training, and Therapeutic Exercises. PLAN FOR NEXT VISIT: Patient will be seen 1w2 to reassess safety with gait, to improve transfer technique, and to increase strength of B LE.    DISCHARGE PLANNING DISCUSSED WITH PATIENT/CAREGIVER: Omid N Lalito Portillo e patient to family with MD supervision when all goals are met. Pt/Caregiver did verbalize understanding of discharge planning.

## 2022-10-27 ENCOUNTER — HOME CARE VISIT (OUTPATIENT)
Dept: SCHEDULING | Facility: HOME HEALTH | Age: 47
End: 2022-10-27
Payer: COMMERCIAL

## 2022-10-27 PROCEDURE — G0157 HHC PT ASSISTANT EA 15: HCPCS

## 2022-10-28 VITALS — RESPIRATION RATE: 18 BRPM | SYSTOLIC BLOOD PRESSURE: 131 MMHG | DIASTOLIC BLOOD PRESSURE: 88 MMHG | TEMPERATURE: 98 F

## 2022-10-28 NOTE — HOME HEALTH
SUBJECTIVE: Patient reported feeling pain #7/10 from her B LE this morning. CAREGIVER INVOLVEMENT/ASSISTANCE NEEDED FOR: Patient's  assists with transportation, ADLS, and IADLS. HOME HEALTH SUPPLIES BY TYPE AND QUANTITY ORDERED/DELIVERED THIS VISIT INCLUDE: None needed for this visit. OBJECTIVE:  See interventions. PATIENT RESPONSE TO TREATMENT: Patient reported feeling increased pain #8/10 from her B LE after walking and performing transfers. PATIENT LEVEL OF UNDERSTANDING OF EDUCATION PROVIDED: Goal met. ASSESSMENT OF PROGRESS TOWARD GOALS: Patient is progressing toward goals for gait and transfers. Pt previously needed S to ambulate safely but progressed to Mod I for gait training today. Patient ambulated with decreased teto and decreased foot clearance but demonstrated good stability and safety awareness when ambulating. Pt also previously needed S to perform sit < > stand transfers but progressed to Mod I for transfer training today from different level surfaces. Pt demonstrated good technique and improved ability to perform transfers independently. In addition, pt reported good follow-up with HEP and walking around her home using Rollator walker. Discussed with patient/CG plan to discharge pt from [facility] PT services next week. Pt/CG verbalized understanding and is in agreement with discharge plan. CONTINUED NEED FOR THE FOLLOWING SKILLS: None. PLAN FOR NEXT VISIT: Patient will be seen 1w1 to reassess safety with gait, to improve transfer technique, and to increase strength of B LE. DISCHARGE PLANNING DISCUSSED WITH PATIENT/CAREGIVER: Discharg e patient to family with MD supervision when all goals are met. Pt/Caregiver did verbalize understanding of discharge planning. Assessment and Summary of Care:  Patient's current functional status before discharge is as follows  Strength: Progressing  ROM: NT  Bed Mobility: Mod I but  is available to help when needed. Transfers:  Mod I  Gait/WC mobility: 150' using Rollator walker Mod I  Stairs: NT  Special Tests: NT  Recommendations: Discharge to outpatient PT under MD supervision.

## 2022-10-31 ENCOUNTER — HOME CARE VISIT (OUTPATIENT)
Dept: HOME HEALTH SERVICES | Facility: HOME HEALTH | Age: 47
End: 2022-10-31
Payer: COMMERCIAL

## 2022-11-01 ENCOUNTER — HOME CARE VISIT (OUTPATIENT)
Dept: SCHEDULING | Facility: HOME HEALTH | Age: 47
End: 2022-11-01
Payer: COMMERCIAL

## 2022-11-01 PROCEDURE — 400013 HH SOC

## 2022-11-01 PROCEDURE — G0151 HHCP-SERV OF PT,EA 15 MIN: HCPCS

## 2022-11-01 NOTE — Clinical Note
Mrs Balwinder Salmeron has been discharged from [address]. She has reached the max benefit from [facility] PT and is ready to resume her outpatient therapy. Presently she is safe and independent in the home.   Thank you

## 2022-11-02 VITALS
HEART RATE: 79 BPM | TEMPERATURE: 98 F | RESPIRATION RATE: 16 BRPM | OXYGEN SATURATION: 99 % | DIASTOLIC BLOOD PRESSURE: 70 MMHG | SYSTOLIC BLOOD PRESSURE: 120 MMHG

## 2022-11-02 NOTE — HOME HEALTH
Pt.clinically discharged and documentation finalized for completion of agency discharge. Please see discharge summary for details.